# Patient Record
Sex: MALE | URBAN - METROPOLITAN AREA
[De-identification: names, ages, dates, MRNs, and addresses within clinical notes are randomized per-mention and may not be internally consistent; named-entity substitution may affect disease eponyms.]

---

## 2021-12-09 ENCOUNTER — EMERGENCY (EMERGENCY)
Facility: HOSPITAL | Age: 51
LOS: 1 days | End: 2021-12-09
Attending: EMERGENCY MEDICINE
Payer: COMMERCIAL

## 2021-12-09 VITALS
HEART RATE: 70 BPM | DIASTOLIC BLOOD PRESSURE: 105 MMHG | TEMPERATURE: 98 F | RESPIRATION RATE: 16 BRPM | OXYGEN SATURATION: 98 % | SYSTOLIC BLOOD PRESSURE: 174 MMHG

## 2021-12-09 LAB
ALBUMIN SERPL ELPH-MCNC: 3.5 G/DL — SIGNIFICANT CHANGE UP (ref 3.3–5)
ALP SERPL-CCNC: 90 U/L — SIGNIFICANT CHANGE UP (ref 40–120)
ALT FLD-CCNC: 27 U/L — SIGNIFICANT CHANGE UP (ref 12–78)
AMPHET UR-MCNC: NEGATIVE — SIGNIFICANT CHANGE UP
ANION GAP SERPL CALC-SCNC: 4 MMOL/L — LOW (ref 5–17)
APAP SERPL-MCNC: <2 UG/ML — SIGNIFICANT CHANGE UP (ref 10–30)
APPEARANCE UR: CLEAR — SIGNIFICANT CHANGE UP
APTT BLD: 36 SEC — HIGH (ref 27.5–35.5)
AST SERPL-CCNC: 14 U/L — LOW (ref 15–37)
BARBITURATES UR SCN-MCNC: NEGATIVE — SIGNIFICANT CHANGE UP
BASOPHILS # BLD AUTO: 0.03 K/UL — SIGNIFICANT CHANGE UP (ref 0–0.2)
BASOPHILS NFR BLD AUTO: 0.4 % — SIGNIFICANT CHANGE UP (ref 0–2)
BENZODIAZ UR-MCNC: NEGATIVE — SIGNIFICANT CHANGE UP
BILIRUB SERPL-MCNC: 0.4 MG/DL — SIGNIFICANT CHANGE UP (ref 0.2–1.2)
BILIRUB UR-MCNC: NEGATIVE — SIGNIFICANT CHANGE UP
BUN SERPL-MCNC: 28 MG/DL — HIGH (ref 7–23)
CALCIUM SERPL-MCNC: 9.2 MG/DL — SIGNIFICANT CHANGE UP (ref 8.5–10.1)
CHLORIDE SERPL-SCNC: 105 MMOL/L — SIGNIFICANT CHANGE UP (ref 96–108)
CK MB CFR SERPL CALC: 4.2 NG/ML — HIGH (ref 0–3.6)
CO2 SERPL-SCNC: 30 MMOL/L — SIGNIFICANT CHANGE UP (ref 22–31)
COCAINE METAB.OTHER UR-MCNC: NEGATIVE — SIGNIFICANT CHANGE UP
COLOR SPEC: YELLOW — SIGNIFICANT CHANGE UP
CREAT SERPL-MCNC: 1.2 MG/DL — SIGNIFICANT CHANGE UP (ref 0.5–1.3)
DIFF PNL FLD: NEGATIVE — SIGNIFICANT CHANGE UP
EOSINOPHIL # BLD AUTO: 0.1 K/UL — SIGNIFICANT CHANGE UP (ref 0–0.5)
EOSINOPHIL NFR BLD AUTO: 1.2 % — SIGNIFICANT CHANGE UP (ref 0–6)
GLUCOSE SERPL-MCNC: 116 MG/DL — HIGH (ref 70–99)
GLUCOSE UR QL: NEGATIVE — SIGNIFICANT CHANGE UP
HCT VFR BLD CALC: 38.7 % — LOW (ref 39–50)
HGB BLD-MCNC: 13.1 G/DL — SIGNIFICANT CHANGE UP (ref 13–17)
IMM GRANULOCYTES NFR BLD AUTO: 0.4 % — SIGNIFICANT CHANGE UP (ref 0–1.5)
INR BLD: 1.07 RATIO — SIGNIFICANT CHANGE UP (ref 0.88–1.16)
KETONES UR-MCNC: NEGATIVE — SIGNIFICANT CHANGE UP
LEUKOCYTE ESTERASE UR-ACNC: NEGATIVE — SIGNIFICANT CHANGE UP
LIDOCAIN IGE QN: 123 U/L — SIGNIFICANT CHANGE UP (ref 73–393)
LYMPHOCYTES # BLD AUTO: 2.67 K/UL — SIGNIFICANT CHANGE UP (ref 1–3.3)
LYMPHOCYTES # BLD AUTO: 32.8 % — SIGNIFICANT CHANGE UP (ref 13–44)
MAGNESIUM SERPL-MCNC: 2 MG/DL — SIGNIFICANT CHANGE UP (ref 1.6–2.6)
MCHC RBC-ENTMCNC: 29.3 PG — SIGNIFICANT CHANGE UP (ref 27–34)
MCHC RBC-ENTMCNC: 33.9 GM/DL — SIGNIFICANT CHANGE UP (ref 32–36)
MCV RBC AUTO: 86.6 FL — SIGNIFICANT CHANGE UP (ref 80–100)
METHADONE UR-MCNC: NEGATIVE — SIGNIFICANT CHANGE UP
MONOCYTES # BLD AUTO: 0.66 K/UL — SIGNIFICANT CHANGE UP (ref 0–0.9)
MONOCYTES NFR BLD AUTO: 8.1 % — SIGNIFICANT CHANGE UP (ref 2–14)
NEUTROPHILS # BLD AUTO: 4.64 K/UL — SIGNIFICANT CHANGE UP (ref 1.8–7.4)
NEUTROPHILS NFR BLD AUTO: 57.1 % — SIGNIFICANT CHANGE UP (ref 43–77)
NITRITE UR-MCNC: NEGATIVE — SIGNIFICANT CHANGE UP
NRBC # BLD: 0 /100 WBCS — SIGNIFICANT CHANGE UP (ref 0–0)
NT-PROBNP SERPL-SCNC: 225 PG/ML — HIGH (ref 0–125)
OPIATES UR-MCNC: NEGATIVE — SIGNIFICANT CHANGE UP
PCP SPEC-MCNC: SIGNIFICANT CHANGE UP
PCP UR-MCNC: NEGATIVE — SIGNIFICANT CHANGE UP
PH UR: 7 — SIGNIFICANT CHANGE UP (ref 5–8)
PLATELET # BLD AUTO: 228 K/UL — SIGNIFICANT CHANGE UP (ref 150–400)
POTASSIUM SERPL-MCNC: 4 MMOL/L — SIGNIFICANT CHANGE UP (ref 3.5–5.3)
POTASSIUM SERPL-SCNC: 4 MMOL/L — SIGNIFICANT CHANGE UP (ref 3.5–5.3)
PROT SERPL-MCNC: 7.8 G/DL — SIGNIFICANT CHANGE UP (ref 6–8.3)
PROT UR-MCNC: NEGATIVE — SIGNIFICANT CHANGE UP
PROTHROM AB SERPL-ACNC: 12.5 SEC — SIGNIFICANT CHANGE UP (ref 10.6–13.6)
RBC # BLD: 4.47 M/UL — SIGNIFICANT CHANGE UP (ref 4.2–5.8)
RBC # FLD: 14.6 % — HIGH (ref 10.3–14.5)
SALICYLATES SERPL-MCNC: <1.7 MG/DL — LOW (ref 2.8–20)
SODIUM SERPL-SCNC: 139 MMOL/L — SIGNIFICANT CHANGE UP (ref 135–145)
SP GR SPEC: 1 — LOW (ref 1.01–1.02)
THC UR QL: NEGATIVE — SIGNIFICANT CHANGE UP
TROPONIN I, HIGH SENSITIVITY RESULT: 9 NG/L — SIGNIFICANT CHANGE UP
TSH SERPL-MCNC: 1.7 UIU/ML — SIGNIFICANT CHANGE UP (ref 0.36–3.74)
UROBILINOGEN FLD QL: NEGATIVE — SIGNIFICANT CHANGE UP
WBC # BLD: 8.13 K/UL — SIGNIFICANT CHANGE UP (ref 3.8–10.5)
WBC # FLD AUTO: 8.13 K/UL — SIGNIFICANT CHANGE UP (ref 3.8–10.5)

## 2021-12-09 PROCEDURE — 93010 ELECTROCARDIOGRAM REPORT: CPT

## 2021-12-09 PROCEDURE — 99285 EMERGENCY DEPT VISIT HI MDM: CPT

## 2021-12-09 PROCEDURE — 90792 PSYCH DIAG EVAL W/MED SRVCS: CPT | Mod: 95

## 2021-12-09 NOTE — ED ADULT NURSE NOTE - HPI (INCLUDE ILLNESS QUALITY, SEVERITY, DURATION, TIMING, CONTEXT, MODIFYING FACTORS, ASSOCIATED SIGNS AND SYMPTOMS)
Pt states he is having auditory hallucinations, which the voices are telling him they will hurt him. Patient is unable to states the date as he states "I don't know", he is aware he is at [facility].   He states he has heard the voices for approx. 6 months.

## 2021-12-09 NOTE — ED PROVIDER NOTE - PROGRESS NOTE DETAILS
spoke with tele psych, requesting CT head, aware patient 1:1, labs, ekg discussed, will consult spoke with tele psych, requesting CT head, aware patient 1:1, labs, ekg discussed, will consult, patient aware of plan and agrees discussed case with Dr. Recio from telepsych, recommend neuro consult, will re evaluate patient after neuro eval dr powell to see pt  pt reevaluated, pt awake, appears to be catatonic now, not answering any questions, following selective commands, will try 1 ativan and reassess, pending psych and neuro reeval pt speaking with select tstaff members, then suddenly got out of bed sat down and started banging his head against the wall. code grey called but pt was placed back in bed prior to security arriving. 1 of ativan given. pt to go to [facility] and then neuro and psych eval seen by neuro, no neurologic concerns per dr juarze can [facility] imaging, pt endorses AH and SI   telepsych updated and will reeval pt accepted to psych, [facility]  pt continues to display self harm behaviors, head banging, attempted to tie the strings of his gown aroudn his neck. strings removed from gown, pt given haldol/ativan and soft wrist restraints

## 2021-12-09 NOTE — ED ADULT NURSE NOTE - OBJECTIVE STATEMENT
Pt states he is hearing voices that are saying "they will kill me" Pt states he is hearing voices that are saying "they will kill me"  Pt arrives from [employer], where he works. Pt states "I feel bad, I have been passing out". Pt states he has been hearing voices saying they are going to hurt him in his head for approx 6 months, he states he has seen a MD about this and has not been prescribed medications of any treatments. Pt Denies SI or HI at this time. He is calm, cooperative, follows commands.   Pt states he lives with his brothers at [address] [place] [address]

## 2021-12-09 NOTE — ED PROVIDER NOTE - OBJECTIVE STATEMENT
51 male presents to ER by ambulance with report of elevated blood pressure. 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 When patient in room in ER patient states he hears voices in his head and they are telling him to kill himself. Patient states he is from [facility], has been living in  for over 20 years, is not , has no children and lives with 4 roomates, patient currently does not have a PMD. Patient states he has been hearing voices for the past year and has never been hospitlized.

## 2021-12-10 ENCOUNTER — INPATIENT (INPATIENT)
Facility: HOSPITAL | Age: 51
LOS: 25 days | Discharge: HOME | End: 2022-01-05
Attending: PSYCHIATRY & NEUROLOGY | Admitting: PSYCHIATRY & NEUROLOGY
Payer: COMMERCIAL

## 2021-12-10 VITALS — TEMPERATURE: 98 F | RESPIRATION RATE: 18 BRPM | WEIGHT: 134.04 LBS

## 2021-12-10 VITALS
TEMPERATURE: 98 F | DIASTOLIC BLOOD PRESSURE: 67 MMHG | HEART RATE: 76 BPM | RESPIRATION RATE: 17 BRPM | OXYGEN SATURATION: 97 % | SYSTOLIC BLOOD PRESSURE: 132 MMHG

## 2021-12-10 DIAGNOSIS — F29 UNSPECIFIED PSYCHOSIS NOT DUE TO A SUBSTANCE OR KNOWN PHYSIOLOGICAL CONDITION: ICD-10-CM

## 2021-12-10 LAB — SARS-COV-2 RNA SPEC QL NAA+PROBE: SIGNIFICANT CHANGE UP

## 2021-12-10 PROCEDURE — 99285 EMERGENCY DEPT VISIT HI MDM: CPT | Mod: 25

## 2021-12-10 PROCEDURE — U0005: CPT

## 2021-12-10 PROCEDURE — 36415 COLL VENOUS BLD VENIPUNCTURE: CPT

## 2021-12-10 PROCEDURE — 71045 X-RAY EXAM CHEST 1 VIEW: CPT | Mod: 26

## 2021-12-10 PROCEDURE — 70450 CT HEAD/BRAIN W/O DYE: CPT | Mod: MA

## 2021-12-10 PROCEDURE — 82553 CREATINE MB FRACTION: CPT

## 2021-12-10 PROCEDURE — 80307 DRUG TEST PRSMV CHEM ANLYZR: CPT

## 2021-12-10 PROCEDURE — 84443 ASSAY THYROID STIM HORMONE: CPT

## 2021-12-10 PROCEDURE — 70450 CT HEAD/BRAIN W/O DYE: CPT | Mod: 26,MA

## 2021-12-10 PROCEDURE — 93005 ELECTROCARDIOGRAM TRACING: CPT

## 2021-12-10 PROCEDURE — 85730 THROMBOPLASTIN TIME PARTIAL: CPT

## 2021-12-10 PROCEDURE — 96374 THER/PROPH/DIAG INJ IV PUSH: CPT

## 2021-12-10 PROCEDURE — 85025 COMPLETE CBC W/AUTO DIFF WBC: CPT

## 2021-12-10 PROCEDURE — 99222 1ST HOSP IP/OBS MODERATE 55: CPT

## 2021-12-10 PROCEDURE — 83690 ASSAY OF LIPASE: CPT

## 2021-12-10 PROCEDURE — U0003: CPT

## 2021-12-10 PROCEDURE — 96375 TX/PRO/DX INJ NEW DRUG ADDON: CPT

## 2021-12-10 PROCEDURE — 81003 URINALYSIS AUTO W/O SCOPE: CPT

## 2021-12-10 PROCEDURE — 80053 COMPREHEN METABOLIC PANEL: CPT

## 2021-12-10 PROCEDURE — 84484 ASSAY OF TROPONIN QUANT: CPT

## 2021-12-10 PROCEDURE — 85610 PROTHROMBIN TIME: CPT

## 2021-12-10 PROCEDURE — 83880 ASSAY OF NATRIURETIC PEPTIDE: CPT

## 2021-12-10 PROCEDURE — 83735 ASSAY OF MAGNESIUM: CPT

## 2021-12-10 PROCEDURE — 71045 X-RAY EXAM CHEST 1 VIEW: CPT

## 2021-12-10 PROCEDURE — 96372 THER/PROPH/DIAG INJ SC/IM: CPT | Mod: XU

## 2021-12-10 RX ORDER — ACETAMINOPHEN 500 MG
650 TABLET ORAL EVERY 6 HOURS
Refills: 0 | Status: DISCONTINUED | OUTPATIENT
Start: 2021-12-10 | End: 2022-01-05

## 2021-12-10 RX ORDER — AMLODIPINE BESYLATE 2.5 MG/1
5 TABLET ORAL ONCE
Refills: 0 | Status: COMPLETED | OUTPATIENT
Start: 2021-12-10 | End: 2021-12-10

## 2021-12-10 RX ORDER — METOPROLOL TARTRATE 50 MG
25 TABLET ORAL ONCE
Refills: 0 | Status: COMPLETED | OUTPATIENT
Start: 2021-12-10 | End: 2021-12-10

## 2021-12-10 RX ORDER — HALOPERIDOL DECANOATE 100 MG/ML
2 INJECTION INTRAMUSCULAR ONCE
Refills: 0 | Status: DISCONTINUED | OUTPATIENT
Start: 2021-12-10 | End: 2021-12-17

## 2021-12-10 RX ORDER — ACETAMINOPHEN 500 MG
650 TABLET ORAL ONCE
Refills: 0 | Status: COMPLETED | OUTPATIENT
Start: 2021-12-10 | End: 2021-12-10

## 2021-12-10 RX ORDER — AMLODIPINE BESYLATE 2.5 MG/1
5 TABLET ORAL DAILY
Refills: 0 | Status: DISCONTINUED | OUTPATIENT
Start: 2021-12-10 | End: 2022-01-05

## 2021-12-10 RX ORDER — HALOPERIDOL DECANOATE 100 MG/ML
5 INJECTION INTRAMUSCULAR ONCE
Refills: 0 | Status: DISCONTINUED | OUTPATIENT
Start: 2021-12-10 | End: 2021-12-10

## 2021-12-10 RX ORDER — ARIPIPRAZOLE 15 MG/1
2 TABLET ORAL DAILY
Refills: 0 | Status: DISCONTINUED | OUTPATIENT
Start: 2021-12-10 | End: 2021-12-14

## 2021-12-10 RX ORDER — LOSARTAN POTASSIUM 100 MG/1
50 TABLET, FILM COATED ORAL ONCE
Refills: 0 | Status: COMPLETED | OUTPATIENT
Start: 2021-12-10 | End: 2021-12-10

## 2021-12-10 RX ORDER — METOPROLOL TARTRATE 50 MG
25 TABLET ORAL
Refills: 0 | Status: DISCONTINUED | OUTPATIENT
Start: 2021-12-10 | End: 2022-01-05

## 2021-12-10 RX ORDER — HALOPERIDOL DECANOATE 100 MG/ML
5 INJECTION INTRAMUSCULAR ONCE
Refills: 0 | Status: COMPLETED | OUTPATIENT
Start: 2021-12-10 | End: 2021-12-10

## 2021-12-10 RX ORDER — AMLODIPINE BESYLATE 2.5 MG/1
5 TABLET ORAL DAILY
Refills: 0 | Status: DISCONTINUED | OUTPATIENT
Start: 2021-12-10 | End: 2021-12-10

## 2021-12-10 RX ORDER — TRAZODONE HCL 50 MG
50 TABLET ORAL AT BEDTIME
Refills: 0 | Status: DISCONTINUED | OUTPATIENT
Start: 2021-12-10 | End: 2022-01-05

## 2021-12-10 RX ORDER — HYDRALAZINE HCL 50 MG
10 TABLET ORAL ONCE
Refills: 0 | Status: COMPLETED | OUTPATIENT
Start: 2021-12-10 | End: 2021-12-10

## 2021-12-10 RX ORDER — METOPROLOL TARTRATE 50 MG
25 TABLET ORAL
Refills: 0 | Status: DISCONTINUED | OUTPATIENT
Start: 2021-12-10 | End: 2021-12-10

## 2021-12-10 RX ORDER — HALOPERIDOL DECANOATE 100 MG/ML
2 INJECTION INTRAMUSCULAR EVERY 6 HOURS
Refills: 0 | Status: DISCONTINUED | OUTPATIENT
Start: 2021-12-10 | End: 2021-12-17

## 2021-12-10 RX ADMIN — LOSARTAN POTASSIUM 50 MILLIGRAM(S): 100 TABLET, FILM COATED ORAL at 11:29

## 2021-12-10 RX ADMIN — Medication 25 MILLIGRAM(S): at 20:41

## 2021-12-10 RX ADMIN — HALOPERIDOL DECANOATE 5 MILLIGRAM(S): 100 INJECTION INTRAMUSCULAR at 12:00

## 2021-12-10 RX ADMIN — Medication 2 MILLIGRAM(S): at 12:52

## 2021-12-10 RX ADMIN — AMLODIPINE BESYLATE 5 MILLIGRAM(S): 2.5 TABLET ORAL at 03:45

## 2021-12-10 RX ADMIN — Medication 10 MILLIGRAM(S): at 05:29

## 2021-12-10 RX ADMIN — Medication 25 MILLIGRAM(S): at 04:59

## 2021-12-10 RX ADMIN — Medication 1 MILLIGRAM(S): at 09:00

## 2021-12-10 RX ADMIN — AMLODIPINE BESYLATE 5 MILLIGRAM(S): 2.5 TABLET ORAL at 11:29

## 2021-12-10 RX ADMIN — Medication 650 MILLIGRAM(S): at 11:29

## 2021-12-10 NOTE — BH INPATIENT PSYCHIATRY ASSESSMENT NOTE - NSBHCHARTREVIEWVS_PSY_A_CORE FT
Vital Signs Last 24 Hrs  T(C): 36.6 (12-10-21 @ 15:16), Max: 36.9 (12-10-21 @ 01:30)  T(F): 97.8 (12-10-21 @ 15:16), Max: 98.5 (12-10-21 @ 01:30)  HR: 76 (12-10-21 @ 15:16) (68 - 84)  BP: 132/67 (12-10-21 @ 15:16) (132/67 - 204/107)  BP(mean): --  RR: 17 (12-10-21 @ 15:16) (16 - 17)  SpO2: 97% (12-10-21 @ 15:16) (97% - 100%)     Vital Signs Last 24 Hrs  T(C): 36.8 (12-10-21 @ 18:07), Max: 36.9 (12-10-21 @ 01:30)  T(F): 98.3 (12-10-21 @ 18:07), Max: 98.5 (12-10-21 @ 01:30)  HR: 76 (12-10-21 @ 15:16) (68 - 84)  BP: 132/67 (12-10-21 @ 15:16) (132/67 - 204/107)  BP(mean): --  RR: 18 (12-10-21 @ 18:07) (16 - 18)  SpO2: 97% (12-10-21 @ 15:16) (97% - 100%)    Orthostatic VS  12-10-21 @ 18:07  Lying BP: --/-- HR: --  Sitting BP: 131/85 HR: 80  Standing BP: 118/88 HR: 93  Site: --  Mode: --

## 2021-12-10 NOTE — ED BEHAVIORAL HEALTH ASSESSMENT NOTE - HPI (INCLUDE ILLNESS QUALITY, SEVERITY, DURATION, TIMING, CONTEXT, MODIFYING FACTORS, ASSOCIATED SIGNS AND SYMPTOMS)
51y o  male, Serbian speaking, originally from [facility], single, employed washing dishes at a restaurant, domiciled with four roommates including his brother, per psyckes a hx of etoh use disorders, cannabis use disorder, substance induced psychotic d/o, anxiety and major depressive disorder; one prior psychiatric hospitalization at [facility] in 2020 with diagnoses of Alcohol Induced Psychotic d/o; no known prior suicidality/aggression/legal/medical hx; bib ems, activated by pt's boss when he reported he felt lightheaded and fainted at work. Psych consult called by intial ed provider who stated pt reported CAH to kill himself.   Patient seen with . even with intepreter, he is a poor historian, with increased speech latency, seemingly decreased attention at times, not answering certain questions, staring straight ahead, with an odd smile on his face at times.  His main complaint is that he doesn't feel well physically, specifically that he feels lightheaded and dizzy. He does not have psychiatric complaints initially. On repeated questioning, he admits to hearing voices chronically. He denies they are command, or that they are to harm himself. he denies si.   he cannot describe the voices, saying several times "I just hear voices."   he initially denies seeing a psychiatrist then says he does so, and that he takes medications but cannot name what they are.   he denies recent substance use of any sort.  utox and bal were negative.  head ct neg   see  note for collateral from brother. his reliability was questionable and he seemed not to have details of pt's history. he said pt took medications and hear voices that he was in danger but could not provide specifics.

## 2021-12-10 NOTE — BH INPATIENT PSYCHIATRY ASSESSMENT NOTE - NSBHCHARTREVIEWLAB_PSY_A_CORE FT
Comprehensive Metabolic Panel (12.09.21 @ 22:00)   Sodium, Serum: 139 mmol/L   Potassium, Serum: 4.0 mmol/L   Chloride, Serum: 105 mmol/L   Carbon Dioxide, Serum: 30 mmol/L   Anion Gap, Serum: 4 mmol/L   Blood Urea Nitrogen, Serum: 28 mg/dL   Creatinine, Serum: 1.20 mg/dL   Glucose, Serum: 116 mg/dL   Calcium, Total Serum: 9.2 mg/dL   Protein Total, Serum: 7.8 g/dL   Albumin, Serum: 3.5 g/dL   Bilirubin Total, Serum: 0.4 mg/dL   Alkaline Phosphatase, Serum: 90 U/L   Aspartate Aminotransferase (AST/SGOT): 14 U/L   Alanine Aminotransferase (ALT/SGPT): 27 U/L Complete Blood Count + Automated Diff (12.09.21 @ 22:00)   WBC Count: 8.13 K/uL   RBC Count: 4.47 M/uL   Hemoglobin: 13.1 g/dL   Hematocrit: 38.7 %   Mean Cell Volume: 86.6 fl   Mean Cell Hemoglobin: 29.3 pg   Mean Cell Hemoglobin Conc: 33.9 gm/dL   Red Cell Distrib Width: 14.6 %   Platelet Count - Automated: 228 K/uL   Auto Neutrophil #: 4.64 K/uL   Auto Lymphocyte #: 2.67 K/uL   Auto Monocyte #: 0.66 K/uL   Auto Eosinophil #: 0.10 K/uL   Auto Basophil #: 0.03 K/uL   Auto Neutrophil %: 57.1: Differential percentages must be correlated with absolute numbers for   clinical significance. %   Auto Lymphocyte %: 32.8 %   Auto Monocyte %: 8.1 %   Auto Eosinophil %: 1.2 %   Auto Basophil %: 0.4 %   Auto Immature Granulocyte %: 0.4: (Includes meta, myelo and promyelocytes) %   Nucleated RBC: 0 /100 WBCs

## 2021-12-10 NOTE — BH PATIENT PROFILE - FALL HARM RISK - UNIVERSAL INTERVENTIONS
Bed in lowest position, wheels locked, appropriate side rails in place/Call bell, personal items and telephone in reach/Instruct patient to call for assistance before getting out of bed or chair/Non-slip footwear when patient is out of bed/[place] to call system/Physically safe environment - no spills, clutter or unnecessary equipment/Purposeful Proactive Rounding/Room/bathroom lighting operational, light cord in reach

## 2021-12-10 NOTE — BH INPATIENT PSYCHIATRY ASSESSMENT NOTE - CURRENT MEDICATION
MEDICATIONS  (STANDING):    MEDICATIONS  (PRN):   MEDICATIONS  (STANDING):  amLODIPine   Tablet 5 milliGRAM(s) Oral daily  ARIPiprazole 2 milliGRAM(s) Oral daily  metoprolol tartrate 25 milliGRAM(s) Oral two times a day    MEDICATIONS  (PRN):  acetaminophen     Tablet .. 650 milliGRAM(s) Oral every 6 hours PRN Moderate Pain (4 - 6), Severe Pain (7 - 10)  haloperidol     Tablet 2 milliGRAM(s) Oral every 6 hours PRN Agitation  haloperidol    Injectable 2 milliGRAM(s) IntraMuscular once PRN Agitation  LORazepam     Tablet 1 milliGRAM(s) Oral every 6 hours PRN Anxiety/ agitation  LORazepam   Injectable 1 milliGRAM(s) IntraMuscular once PRN Agitation  traZODone 50 milliGRAM(s) Oral at bedtime PRN Insomnia

## 2021-12-10 NOTE — ED BEHAVIORAL HEALTH ASSESSMENT NOTE - SUMMARY
51y o  male, Turkish speaking, originally from [facility], single, employed washing dishes at a restaurant, domiciled with four roommates including his brother, per psyckes a hx of etoh use disorders, cannabis use disorder, substance induced psychotic d/o, anxiety and major depressive disorder; one prior psychiatric hospitalization at [facility] in 2020 with diagnoses of Alcohol Induced Psychotic d/o; no known prior suicidality/aggression/legal/medical hx; bib ems, activated by pt's boss when he reported he felt lightheaded and fainted at work. Psych consult called by intial ed provider who stated pt reported CAH to kill himself.  on evaluation pt reports dizziness. he fainted earlier tonight. he is a poor historian and shows some signs of psychosis; psyckes review reveals hx of substance abuse and substance induced psychotic d/o, though pt's utox and BAL are negative. most likely pt's presentation is influenced by substance use of some kind, given his hx. it's possible pt used occult substance or is withdrawing from etoh or other substance. vitamin deficiency, metabolic abnormality and neurological illnesses are also possible. would replete with thiamine, psych will reassess, and neuro consult would also be useful.

## 2021-12-10 NOTE — BH INPATIENT PSYCHIATRY ASSESSMENT NOTE - NSBHASSESSSUMMFT_PSY_ALL_CORE
51M, Tamazight-speaking, originally from [facility], single, employed washing dishes at a restaurant, domiciled with four roommates including his brother, per psyckes a hx of etoh use disorders, cannabis use disorder, substance induced psychotic d/o, anxiety and major depressive disorder; one prior psychiatric hospitalization at [facility] in 2020 with diagnoses of Alcohol Induced Psychotic d/o; no known prior suicidality/aggression/legal/medical hx; bib ems, activated by pt's boss when he reported he felt lightheaded and fainted at work. Psych consult called by intial ed provider who stated pt reported CAH to kill himself. Transferred to [facility] for CAH/psychosis.     On assessment, patient is sleepy, with difficulty staying awake, uncooperative, and minimally responsive to questioning. Patient says that he does not feel well. States he feels dizzy but does not elaborate. Does not respond to questions w/r/t/ psychiatric symptoms. Overall impression is substance-induced psychotic disorder vs. substance-induced mood disorder vs. primary psychotic disorder.     Requires inpatient admission for safety, stabilization, and safe discharge planning.     PLAN:    1. Legal: Admitted on 9.27 status  2. Safety: No reported SI/SIB/HI/VI currently on unit; continue routine observation.     3. Psychiatric:     4. Therapy: individual therapy, group & milieu therapy  5. Medical: No acute medical needs identified  6. Obtain collateral  7. Disposition: When stable    51M, Malay-speaking, originally from [facility], single, employed washing dishes at a restaurant, domiciled with four roommates including his brother, per psyckes a hx of etoh use disorders, cannabis use disorder, substance induced psychotic d/o, anxiety and major depressive disorder; one prior psychiatric hospitalization at [facility] in 2020 with diagnoses of Alcohol Induced Psychotic d/o; no known prior suicidality/aggression/legal/medical hx; bib ems, activated by pt's boss when he reported he felt lightheaded and fainted at work. Psych consult called by intial ed provider who stated pt reported CAH to kill himself. Transferred to [facility] for CAH/psychosis.     On assessment, patient is sleepy, with difficulty staying awake, uncooperative, and minimally responsive to questioning. Patient says that he does not feel well. States he feels dizzy but does not elaborate. Does not respond to questions w/r/t/ psychiatric symptoms. Overall impression is substance-induced psychotic disorder vs. substance-induced mood disorder vs. primary psychotic disorder.     Requires inpatient admission for safety, stabilization, and safe discharge planning.     PLAN:   1. Legal: Admitted on 9.27 status  2. Safety: No reported SI/SIB/HI/VI currently on unit; continue routine observation.   3. Psychiatric: Abilify 2mg PO daily for psychosis. PRN Haldol 2mg PO/IM Q6H and Ativan 1mg PO/IM for agitation, PRN Trazodone 50mg PO QHS for insomnia  4. Therapy: individual therapy, group & milieu therapy  5. Medical: Amlodipine 5mg daily and Metoprolol 25mg BID continued with hold parameters.    6. Obtain collateral  7. Disposition: When stable

## 2021-12-10 NOTE — ED ADULT NURSE REASSESSMENT NOTE - NSIMPLEMENTINTERV_GEN_ALL_ED
Implemented All Fall with Harm Risk Interventions:  [place] to call system. Call bell, personal items and telephone within reach. Instruct patient to call for assistance. Room bathroom lighting operational. Non-slip footwear when patient is off stretcher. Physically safe environment: no spills, clutter or unnecessary equipment. Stretcher in lowest position, wheels locked, appropriate side rails in place. Provide visual cue, wrist band, yellow gown, etc. Monitor gait and stability. Monitor for mental status changes and reorient to person, place, and time. Review medications for side effects contributing to fall risk. Reinforce activity limits and safety measures with patient and family. Provide visual clues: red socks.

## 2021-12-10 NOTE — BH INPATIENT PSYCHIATRY ASSESSMENT NOTE - CASE SUMMARY
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 As [per ED documentation, patient known to have a PPH of alcohol use disorder, cannabis use disorder, substance induced psychotic d/o, and anxiety and major depressive disorder. Patient with one known prior psychiatric hospitalization at [facility] in 2020 with diagnoses of alcohol induced psychotic disorder, no known prior suicidality or aggression. Patient BIBEMS to ED, activated by patient’s boss when he reported he felt lightheaded and fainted at work. While at ED, psych consult called by initial ED provider after patient reported CAH to kill himself. Patient now transferred to [facility] for symptom stabilization. Of note, patient’s UTox and BAL were negative.   Patient interviewed with the aid of [place]  services. During interview, patient is somnolent, is minimally engaging with poor eye contact. He reports that he felt dizzy and does not answer any further questions directed at him. Repeated attempts to engage him are unsuccessful and interview terminated. Plan is to initiate Abilify 2mg PO daily for psychosis. PRN Haldol and Ativan also added for agitation. Rest as documented in Dr. Smith’s note.  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 As per ED documentation, patient known to have a PPH of alcohol use disorder, cannabis use disorder, substance induced psychotic d/o, and anxiety and major depressive disorder. Patient with one known prior psychiatric hospitalization at [facility] in 2020 with diagnoses of alcohol induced psychotic disorder, no known prior suicidality or aggression. Patient BIBEMS to ED, activated by patient’s boss when he reported he felt lightheaded and fainted at work. While at ED, psych consult called by initial ED provider after patient reported CAH to kill himself. Patient now transferred to [facility] for symptom stabilization. Of note, patient’s UTox and BAL were negative.   Patient interviewed with the aid of [place]  services. During interview, patient is somnolent, is minimally engaging with poor eye contact. He reports that he felt dizzy and does not answer any further questions directed at him. Repeated attempts to engage him are unsuccessful and interview terminated. Plan is to initiate Abilify 2mg PO daily for psychosis. PRN Haldol and Ativan also added for agitation. Rest as documented in Dr. Smith’s note.

## 2021-12-10 NOTE — BH PATIENT PROFILE - HOME MEDICATIONS
losartan 50 mg oral tablet , 1 tab(s) orally  metoprolol tartrate 25 mg oral tablet  amLODIPine 5 mg oral tablet

## 2021-12-10 NOTE — BH INPATIENT PSYCHIATRY ASSESSMENT NOTE - HPI (INCLUDE ILLNESS QUALITY, SEVERITY, DURATION, TIMING, CONTEXT, MODIFYING FACTORS, ASSOCIATED SIGNS AND SYMPTOMS)
51M, Amharic-speaking, originally from [facility], single, employed washing dishes at a restaurant, domiciled with four roommates including his brother, per psyckes a hx of etoh use disorders, cannabis use disorder, substance induced psychotic d/o, anxiety and major depressive disorder; one prior psychiatric hospitalization at [facility] in 2020 with diagnoses of Alcohol Induced Psychotic d/o; no known prior suicidality/aggression/legal/medical hx; bib ems, activated by pt's boss when he reported he felt lightheaded and fainted at work. Psych consult called by intial ed provider who stated pt reported CAH to kill himself. Transferred to [facility] for CAH/psychosis.     On assessment, patient is sleepy, with difficulty staying awake, uncooperative, and minimally responsive to questioning. Patient says that he does not feel well. States he feels dizzy but does not elaborate. Does not respond to questions w/r/t/ psychiatric symptoms. Patient unaware that he is in a psychiatric hospital. Does answer questions about whether he has been in a psychiatric hospital or received psychotropic medication in the past. Patient nods off throughout interview, ultimately leading to termination of the interview.  51M, Ukrainian-speaking, originally from [facility], single, employed washing dishes at a restaurant, domiciled with four roommates including his brother, per benji a hx of etoh use disorders, cannabis use disorder, substance induced psychotic d/o, anxiety and major depressive disorder; one prior psychiatric hospitalization at [facility] in 2020 with diagnoses of Alcohol Induced Psychotic d/o; no known prior suicidality/aggression/legal/medical hx; bib ems, activated by pt's boss when he reported he felt lightheaded and fainted at work. Psych consult called by intial ed provider who stated pt reported CAH to kill himself. Transferred to [facility] for CAH/psychosis.     On assessment, patient is sleepy, with difficulty staying awake, uncooperative, and minimally responsive to questioning. Patient says that he does not feel well. States he feels dizzy but does not elaborate. Does not respond to questions w/r/t/ psychiatric symptoms. Patient unaware that he is in a psychiatric hospital. Does answer questions about whether he has been in a psychiatric hospital or received psychotropic medication in the past. Patient nods off throughout interview, ultimately leading to termination of the interview.     From ED  Asssessment Note 12/10/2021:   "51y o  male, Albanian speaking, originally from [facility], single, employed washing dishes at a restaurant, domiciled with four roommates including his brother, per benji a hx of etoh use disorders, cannabis use disorder, substance induced psychotic d/o, anxiety and major depressive disorder; one prior psychiatric hospitalization at [facility] in 2020 with diagnoses of Alcohol Induced Psychotic d/o; no known prior suicidality/aggression/legal/medical hx; bib ems, activated by pt's boss when he reported he felt lightheaded and fainted at work. Psych consult called by intial ed provider who stated pt reported CAH to kill himself.   Patient seen with . even with intepreter, he is a poor historian, with increased speech latency, seemingly decreased attention at times, not answering certain questions, staring straight ahead, with an odd smile on his face at times.  His main complaint is that he doesn't feel well physically, specifically that he feels lightheaded and dizzy. He does not have psychiatric complaints initially. On repeated questioning, he admits to hearing voices chronically. He denies they are command, or that they are to harm himself. he denies si.   he cannot describe the voices, saying several times "I just hear voices."   he initially denies seeing a psychiatrist then says he does so, and that he takes medications but cannot name what they are.   he denies recent substance use of any sort.  utox and bal were negative.  head ct neg   see  note for collateral from brother. his reliability was questionable and he seemed not to have details of pt's history. he said pt took medications and hear voices that he was in danger but could not provide specifics."   51M, Icelandic-speaking, originally from [facility], single, employed washing dishes at a restaurant, domiciled with four roommates including his brother, per benji a hx of etoh use disorders, cannabis use disorder, substance induced psychotic d/o, anxiety and major depressive disorder; one prior psychiatric hospitalization at [facility] in 2020 with diagnoses of Alcohol Induced Psychotic d/o; no known prior suicidality/aggression/legal/medical hx; bib ems to [facility], activated by pt's boss when he reported he felt lightheaded and fainted at work. Psych consult called by intial ed provider who stated pt reported CAH to kill himself. Transferred to [facility] for CAH/psychosis.     On assessment, patient is sleepy, with difficulty staying awake, uncooperative, and minimally responsive to questioning. Patient says that he does not feel well. States he feels dizzy but does not elaborate. Does not respond to questions w/r/t/ psychiatric symptoms. Patient unaware that he is in a psychiatric hospital. Does answer questions about whether he has been in a psychiatric hospital or received psychotropic medication in the past. Patient nods off throughout interview, ultimately leading to termination of the interview.     From ED  Asssessment Note 12/10/2021:   "51y o  male, Azeri speaking, originally from [facility], single, employed washing dishes at a restaurant, domiciled with four roommates including his brother, per brookejennie a hx of etoh use disorders, cannabis use disorder, substance induced psychotic d/o, anxiety and major depressive disorder; one prior psychiatric hospitalization at [facility] in 2020 with diagnoses of Alcohol Induced Psychotic d/o; no known prior suicidality/aggression/legal/medical hx; bib ems, activated by pt's boss when he reported he felt lightheaded and fainted at work. Psych consult called by intial ed provider who stated pt reported CAH to kill himself.   Patient seen with . even with intepreter, he is a poor historian, with increased speech latency, seemingly decreased attention at times, not answering certain questions, staring straight ahead, with an odd smile on his face at times.  His main complaint is that he doesn't feel well physically, specifically that he feels lightheaded and dizzy. He does not have psychiatric complaints initially. On repeated questioning, he admits to hearing voices chronically. He denies they are command, or that they are to harm himself. he denies si.   he cannot describe the voices, saying several times "I just hear voices."   he initially denies seeing a psychiatrist then says he does so, and that he takes medications but cannot name what they are.   he denies recent substance use of any sort.  utox and bal were negative.  head ct neg   see  note for collateral from brother. his reliability was questionable and he seemed not to have details of pt's history. he said pt took medications and hear voices that he was in danger but could not provide specifics."

## 2021-12-10 NOTE — ED BEHAVIORAL HEALTH ASSESSMENT NOTE - NS ED BHA DEMOGRAPHICS MEDICAL RECORD REVIEWED CONSENT OBTAINED YN
[No falls in past year] : Patient reported no falls in the past year [0] : 1) Little interest or pleasure doing things: Not at all (0) [[id number]] : 0 Yes

## 2021-12-10 NOTE — BH INPATIENT PSYCHIATRY ASSESSMENT NOTE - MSE UNSTRUCTURED FT
MSE:  MAR/BEH: Adult male in no acute distress; dressed in hospital gown; sleepy yet somewhat arousable, uncooperative; poor eye contact.   SPEECH: normal rate, tone, volume; diminished production   MOTOR: No PMR/PMA; no other abnormal movements.   MOOD: “don't feel well"  AFFECT: depressed; blunted, constricted, limited range.   THOUGHT PROCESS: impovershed.   THOUGHT CONTENT: Denies SI/HI; no evidence of delusions.   PERCEPTIONS: Unable to determine AVH. Does not appear internally preoccupied.   COGNITION: Grossly intact.   INSIGHT: Poor.   JUDGMENT: Poor.  IMPULSE CONTROL: Intact.   MAR/BEH: Adult male in no acute distress; dressed in hospital gown; sleepy yet somewhat arousable, uncooperative; poor eye contact.   SPEECH: normal rate, tone, volume; diminished production   MOTOR: No PMR/PMA; no other abnormal movements.   MOOD: “don't feel well"  AFFECT: depressed; blunted, constricted, limited range.   THOUGHT PROCESS: impoverished.   THOUGHT CONTENT: Denies SI/HI; no evidence of delusions.   PERCEPTIONS: Unable to determine AVH. Does not appear internally preoccupied.   COGNITION: Grossly intact.   INSIGHT: Poor.   JUDGMENT: Poor.  IMPULSE CONTROL: Intact.

## 2021-12-10 NOTE — BH INPATIENT PSYCHIATRY ASSESSMENT NOTE - DESCRIPTION
From [facility]. Works as . Lives with roommates. History of substance misuse.

## 2021-12-10 NOTE — ED BEHAVIORAL HEALTH ASSESSMENT NOTE - ESTIMATED INTELLIGENCE
Subjective:      Concetta Hargrove is a 29 y.o. female who presents with Fever (fevers, breast pain on friday night)            This is a new problem. Pt reports she developed high fevers, up 105F over the last 2 days. She admits she did have some urinary frequency last week but she denies any other symptoms. Denies dysuria, urgency, hematuria, nausea or vomiting. She is one month post partum, she is breastfeeding. Denies any pain or warmth to her breasts. She admits something similar happened 3 years ago after her first baby and she was dx in the ER with a UTI. She has been in recent contact with her OBGYN but advised to come to the  for her high fevers. She has been taking acetaminophen and ibuprofen to help reduce her fever.        Review of Systems   Constitutional: Positive for fever.   Gastrointestinal: Negative for abdominal pain, nausea and vomiting.   Genitourinary: Positive for frequency. Negative for dysuria, flank pain and urgency.   All other systems reviewed and are negative.    No past medical history on file.   Past Surgical History:   Procedure Laterality Date   • REPEAT C SECTION N/A 3/28/2018    Procedure: REPEAT C SECTION;  Surgeon: Bernie Barriga M.D.;  Location: LABOR AND DELIVERY;  Service: Labor and Delivery   • PRIMARY C SECTION  7/6/2015    Procedure: PRIMARY C SECTION;  Surgeon: Bernie Barriga M.D.;  Location: LABOR AND DELIVERY;  Service:    • ABDOMINAL EXPLORATION     • APPENDECTOMY     • TONSILLECTOMY        Social History     Social History   • Marital status:      Spouse name: N/A   • Number of children: N/A   • Years of education: N/A     Occupational History   • Not on file.     Social History Main Topics   • Smoking status: Never Smoker   • Smokeless tobacco: Never Used   • Alcohol use No      Comment: occasional   • Drug use: No   • Sexual activity: Not on file     Other Topics Concern   • Not on file     Social History Narrative   • No narrative on file           "Objective:     /86   Pulse (!) 122   Temp 37.4 °C (99.3 °F)   Resp 20   Ht 1.575 m (5' 2\")   Wt 67.1 kg (148 lb)   SpO2 94%   BMI 27.07 kg/m²      Physical Exam   Constitutional: She is oriented to person, place, and time. Vital signs are normal. She appears well-developed and well-nourished.   HENT:   Head: Normocephalic and atraumatic.   Right Ear: Tympanic membrane and external ear normal.   Left Ear: Tympanic membrane and external ear normal.   Nose: Nose normal.   Eyes: EOM are normal. Pupils are equal, round, and reactive to light.   Neck: Normal range of motion.   Cardiovascular: Normal rate and regular rhythm.    Pulmonary/Chest: Effort normal.   Abdominal: Soft. Normal appearance and bowel sounds are normal. There is no tenderness. There is no CVA tenderness.   Musculoskeletal: Normal range of motion.   Lymphadenopathy:        Head (right side): Submandibular adenopathy present.        Head (left side): Submandibular adenopathy present.   Neurological: She is alert and oriented to person, place, and time.   Skin: Skin is warm and dry. Capillary refill takes less than 2 seconds.   Psychiatric: She has a normal mood and affect. Her speech is normal and behavior is normal. Thought content normal.   Vitals reviewed.         Lab Results   Component Value Date/Time    POCCOLOR light yellow 04/22/2018 05:28 PM    POCAPPEAR slighty cloudy 04/22/2018 05:28 PM    POCLEUKEST moderate 04/22/2018 05:28 PM    POCNITRITE neg 04/22/2018 05:28 PM    POCUROBILIGE neg 04/22/2018 05:28 PM    POCPROTEIN neg 04/22/2018 05:28 PM    POCURPH 5.0 04/22/2018 05:28 PM    POCBLOOD trace 04/22/2018 05:28 PM    POCSPGRV 1.010 04/22/2018 05:28 PM    POCKETONES neg 04/22/2018 05:28 PM    POCBILIRUBIN neg 04/22/2018 05:28 PM    POCGLUCUA neg 04/22/2018 05:28 PM           Assessment/Plan:     1. Fever, unspecified fever cause  - POCT Urinalysis  - cefTRIAXone (ROCEPHIN) injection 1 g; 1,000 mg by Intramuscular route Once.    2. " Urinary tract infection with hematuria, site unspecified  - Urine Culture; Future  - ciprofloxacin (CIPRO) 500 MG Tab; Take 1 Tab by mouth every 12 hours for 7 days.  Dispense: 14 Tab; Refill: 0  - cefTRIAXone (ROCEPHIN) injection 1 g; 1,000 mg by Intramuscular route Once.    Discussed with patient her condition is guarded due to high fevers and potentially prolonged disease progression since she was virtually asymptomatic. At this time she does not appear septic, her vitals are stable.   She admits that her lochia is light and denies any heavy vaginal bleeding or passing of clots.  Discussed with patient the relative infant risk is low pertaining to how much antibiotic baby will receive through breastmilk. Mother advised she can pump and dump as well, she understands  Continue tylenol and ibuprofen as needed for fevers  Strict ER precautions for continued fevers, new vomiting or generalized worsening condition.  Increase water intake  Will call with urine cx results  Supportive care, differential diagnoses, and indications for immediate follow-up discussed with patient.    Pathogenesis of diagnosis discussed including typical length and natural progression.      Instructed to return to  or nearest emergency department if symptoms fail to improve, for any change in condition, further concerns, or new concerning symptoms.  Patient states understanding of the plan of care and discharge instructions.'     Average

## 2021-12-10 NOTE — BH INPATIENT PSYCHIATRY ASSESSMENT NOTE - RISK ASSESSMENT
Risk factors include hx substance abuse, hx of psychosis, not currently in treatment, unwillingness to engage in treatment planning, limited insight, immigration status.   Protective factors include stable housing and work.

## 2021-12-10 NOTE — ED BEHAVIORAL HEALTH NOTE - BEHAVIORAL HEALTH NOTE
Patient seen and reassessed this morning. Chart reviewed. Spoke at length with EM provider. Patient this morning has been selectively mute with certain staff. He was briefly thought to be catatonic and given lorazepam 1 mg at 8:10 AM with good effect. Pacific Language  #[id number] utilized.    FULL NOTE TO FOLLOW  PATIENT FOR PSYCH ADMISSION ONCE MEDICALLY CLEARED.     Impression/Plan:  Tripp is a 51 year-old  male, Cook Islander speaking, originally from [facility], single, employed washing dishes at a restaurant, domiciled with four roommates including his brother, per psyckes a hx of etoh use disorders, cannabis use disorder, substance induced psychotic d/o, anxiety and major depressive disorder; one prior psychiatric hospitalization at [facility] in 2020 with diagnoses of Alcohol Induced Psychotic d/o; no known prior suicidality/aggression/legal/medical hx; bib ems, activated by pt's boss when he reported he felt lightheaded and fainted at work. Psych consult called by intial ed provider who stated pt reported CAH to kill himself. On re-evaluation this morning patient is endorsing thought reports dizziness. he fainted earlier tonight. he is a poor historian and shows some signs of psychosis; psyckes review reveals hx of substance abuse and substance induced psychotic d/o, though pt's utox and BAL are negative. most likely pt's presentation is influenced by substance use of some kind, given his hx. it's possible pt used occult substance or is withdrawing from etoh or other substance. vitamin deficiency, metabolic abnormality and neurological illnesses are also possible. would replete with thiamine, psych will reassess, and neuro consult would also be useful.    Differential remains broad at this time and includes: unspecified psychotic disorder versus substance induced psychotic d/o versus unspecified mood disorder     Plan:   -Per EM provider, patient is medically cleared at this time  -Admit to inpatient psychiatry on 9.27 involuntary status  -Continue patient outpatient HTN meds, as documented per EM  -Recommending Haldol 5 mg q6 PO/IM PRN agitation, Ativan q6 PO/IM PRN agitation, Benadryl 50 mg PO/IM q6 PRN agitation Patient seen and reassessed this morning. Chart reviewed. Spoke at length with EM provider. Patient this morning has been selectively mute with certain staff. He was briefly thought to be catatonic and given lorazepam 1 mg at 8:10 AM with good effect. Pacific Language  #[id number] utilized. Patient endorses AH of "voices" as well as low mood. He also endorses having "thoughts to hurt himself". Earlier this morning patient got up from stretcher and started banging his head against the wall, Chuck Potts was called. Patient was seen by neurology and "cleared". Exam is limited as he answers in short phrases and mumbles. Endorses SI but denies intent or specific plan. Cannot elaborate or engage in safety planning at this time. Denies any HI/violent/aggressive ideation.    On mental status exam, patient appears dishevelled. He intermittently closes his eyes at times during interview and at other times is staring intensely. He appears to be responding to internal stimuli at times. At this moment he is calm and mostly cooperative. Assessment of gait was deferred. No abnormal movements noted. Speech is soft, mumbled and not very productive. There is increased response latency. Mood reported as "bad"; affect appears dysphoric, blunted in range, affect is appropriate to content discussed. TP is non-linear, illogical, TC is remarkable for suicidal ideation without intent or plan and thoughts of self harm without suicidal intent. Denies any homicidal, violent, or aggressive ideation, intent or plan. Endorses AH of "voices". Denies VH or any other perceptual disturbances. Denies any paranoid ideation. No overt delusions elicited. Alert and oriented x4. Cognition is grossly intact. Insight and judgment are poor.    Impression/Plan:  Tripp is a 51 year-old  male, Angolan speaking, originally from [place], single, employed washing dishes at a restaurant, domiciled with four roommates including his brother, per psyckes a hx of etoh use disorders, cannabis use disorder, substance induced psychotic d/o, anxiety and major depressive disorder; one prior psychiatric hospitalization at [facility] in 2020 with diagnoses of Alcohol Induced Psychotic d/o; no known prior suicidality/aggression/legal/medical hx; bib ems, activated by pt's boss when he reported he felt lightheaded and fainted at work. Psych consult called by intial ed provider who stated pt reported CAH to kill himself. On re-evaluation this morning patient is endorsing thoughts of wanting to hurt himself and some AH of "voices". He also started banging his head against the wall and required a "Code Grey" being called and medication to maintain safety in the ED. Patient's Utox and BAL are negative. Per EM and neurology, patient is medically cleared. He continues to be unsafe for discharge at this time and would benefit from inpatient psychiatry for safe, stabilization and appropriate aftercare planning.    Differential remains broad at this time and includes: unspecified psychotic disorder versus substance induced psychotic d/o versus unspecified mood disorder     Plan:   -Per EM provider, patient is medically cleared at this time  -Admit to inpatient psychiatry on 9.27 involuntary status  -Patient will need 1:1 on while inpatient unit for safety  -Continue patient outpatient HTN meds, as documented per EM  -Consider started olanzapine 10 mg at bedtime to target psychotic symptoms  -Recommending Haldol 5 mg q6 PO/IM PRN agitation, Ativan q6 PO/IM PRN agitation, Benadryl 50 mg PO/IM q6 PRN agitation Patient seen and reassessed this morning. Chart reviewed. Spoke at length with EM provider. Patient this morning has been selectively mute with certain staff. He was briefly thought to be catatonic and given lorazepam 1 mg at 8:10 AM with good effect. Pacific Language  #[id number] utilized. Patient endorses AH of "voices" as well as low mood. He also endorses having "thoughts to hurt himself". Earlier this morning patient got up from stretcher and started banging his head against the wall, Chuck Potts was called. Patient was seen by neurology and "cleared". Exam is limited as he answers in short phrases and mumbles. Endorses SI but denies intent or specific plan. Cannot elaborate or engage in safety planning at this time. Denies any HI/violent/aggressive ideation.    On mental status exam, patient appears dishevelled. He intermittently closes his eyes at times during interview and at other times is staring intensely. He appears to be responding to internal stimuli at times. At this moment he is calm and mostly cooperative. Assessment of gait was deferred. No abnormal movements noted. Speech is soft, mumbled and not very productive. There is increased response latency. Mood reported as "bad"; affect appears dysphoric, blunted in range, affect is appropriate to content discussed. TP is non-linear, illogical, TC is remarkable for suicidal ideation without intent or plan and thoughts of self harm without suicidal intent. Denies any homicidal, violent, or aggressive ideation, intent or plan. Endorses AH of "voices". Denies VH or any other perceptual disturbances. Denies any paranoid ideation. No overt delusions elicited. Alert and oriented x4. Cognition is grossly intact. Insight and judgment are poor.    Impression/Plan:  Tripp is a 51 year-old  male, Kuwaiti speaking, originally from [facility], single, employed washing dishes at a restaurant, domiciled with four roommates including his brother, per psyckes a hx of etoh use disorders, cannabis use disorder, substance induced psychotic d/o, anxiety and major depressive disorder; one prior psychiatric hospitalization at [facility] in 2020 with diagnoses of Alcohol Induced Psychotic d/o; no known prior suicidality/aggression/legal/medical hx; bib ems, activated by pt's boss when he reported he felt lightheaded and fainted at work. Psych consult called by intial ed provider who stated pt reported CAH to kill himself. On re-evaluation this morning patient is endorsing thoughts of wanting to hurt himself and some AH of "voices". He also started banging his head against the wall and required a "Code Grey" being called and medication to maintain safety in the ED. Patient's Utox and BAL are negative. Per EM and neurology, patient is medically cleared. He continues to be unsafe for discharge at this time and would benefit from inpatient psychiatry for safe, stabilization and appropriate aftercare planning.    Differential remains broad at this time and includes: unspecified psychotic disorder versus substance induced psychotic d/o versus unspecified mood disorder     Plan:   -Per EM provider, patient is medically cleared at this time  -Admit to inpatient psychiatry on 9.27 involuntary status  -Patient will need 1:1 on while inpatient unit for safety  -Continue patient outpatient HTN meds, as documented per EM, specifically: amlodipine 5 mg daily; metoprolol 25 mg daily; and losartan 50 mg daily  -Consider started olanzapine 10 mg at bedtime to target psychotic symptoms  -Recommending Haldol 5 mg q6 PO/IM PRN agitation, Ativan q6 PO/IM PRN agitation, Benadryl 50 mg PO/IM q6 PRN agitation

## 2021-12-10 NOTE — ED BEHAVIORAL HEALTH NOTE - BEHAVIORAL HEALTH NOTE
===================  PRE-HOSPITAL COURSE  ===================  SOURCE:  RN ____ and secondhand ED documentation.  DETAILS:  Per chart, patient     ============  ED COURSE  ============  SOURCE:  RN ____ and secondhand ED documentation.  ARRIVAL:  Per chart and RN, patient arrived ______. Per RN, patient was _______ upon arrival, and ______ with triage process.  BELONGINGS:  Per RN, patient arrived with ______. All belongings were provided to hospital security, and patient is currently in a gown with a 1:1 staff member.  BEHAVIOR: RN described patient to be _______, presenting with ________ thought process, (AAOx3?), presenting with ____ mood and ____ affect, remains in ______ behavioral and impulse control, is ____ violent/aggressive. RN stated that the patient is (SI/HI/A/VH?). RN stated that there ______ visible marks, bruises, or lacerations on the body. RN stated that the patient appears to be _____-groomed, maintains ____ hygiene, and reports _____ ADLs, ambulates __________ (without assistance?).  TREATMENT:  Per chart and RN, patient (did/did not) PRN medications.  VISITORS:  Per RN,     ========================  FOR EACH COLLATERAL   ========================  NAME:  NUMBER:  RELATIONSHIP:  RELIABILITY:  COMMENTS:     ========================  PATIENT DEMOGRAPHICS:  ========================  HPI  BASELINE FUNCTIONING:  DATE HPI STARTED:  DECOMPENSATION:  SUICIDALITY  VIOLENCE:    SUBSTANCE:          ========================  PAST PSYCHIATRIC HISTORY  ========================  DATE PAST PSYCHIATRIC HISTORY STARTED:  MAIN PSYCHIATRIC DIAGNOSIS:  PSYCHIATRIC HOSPITALIZATIONS:    PRIOR ILLNESS:  SUICIDALITY:    VIOLENCE:    SUBSTANCE USE:       ==============  OTHER HISTORY  ==============  CURRENT MEDICATION:    MEDICAL HISTORY:    ALLERGIES:     LEGAL ISSUES:  FIREARM ACCESS:  SOCIAL HISTORY:     COVID Exposure Screen- collateral (i.e. third-party, chart review, belongings, etc; include EMS and ED staff)  1.        *Has the patient had a COVID-19 test in the last 90 days?  (  ) Yes   (  ) No   (  ) Unknown- Reason: _____  IF YES PROCEED TO QUESTION #2. IF NO OR UNKNOWN, PLEASE SKIP TO QUESTION #3.  2.        Date of test(s) and result(s): ________  3.        *Has the patient tested positive for COVID-19 antibodies? (  ) Yes   (  ) No   (  ) Unknown- Reason: _____  IF YES PROCEED TO QUESTION #4. IF NO or UNKNOWN, PLEASE SKIP TO QUESTION #5.  4.        Date of positive antibody test: ________  5.        *Has the patient received 2 doses of the COVID-19 vaccine? (  ) Yes   (  ) No   (  ) Unknown- Reason: _____  IF YES PROCEED TO QUESTION #6. IF NO or UNKNOWN, PLEASE SKIP TO QUESTION #7.  6.         Date of second dose: ________  7.        *In the past 10 days, has the patient been around anyone with a positive COVID-19 test?* (  ) Yes   (  ) No   (  ) Unknown- Reason: __  IF YES PROCEED TO QUESTION #8. IF NO or UNKNOWN, PLEASE SKIP TO QUESTION #13.  8.        Was the patient within 6 feet of them for at least 15 minutes? (  ) Yes   (  ) No   (  ) Unknown- Reason: _____  9.        Did the patient provide care for them? (  ) Yes   (  ) No   (  ) Unknown- Reason: ______  10.     Did the patient have direct physical contact with them (touched, hugged, or kissed them)? (  ) Yes   (  ) No	(  ) Unknown- Reason: __  11.     Did the patient share eating or drinking utensils with them? (  ) Yes   (  ) No	(  ) Unknown- Reason: ____  12.     Did they sneeze, cough, or somehow get respiratory droplets on the patient? (  ) Yes   (  ) No	(  ) Unknown- Reason: ______  13.     *Has the patient been out of [place] within the past 10 days?* (  ) Yes   (  ) No   (  ) Unknown- Reason: _____  IF YES PLEASE ANSWER THE FOLLOWING QUESTIONS:  14.     Which state/country did they go to? ______  15.     Were they there over 24 hours? (  ) Yes   (  ) No	(  ) Unknown- Reason: ______  16.     Date of return to [facility]: ______ ===================  PRE-HOSPITAL COURSE  ===================  SOURCE:  RN and secondhand ED documentation.  DETAILS:  Per chart, patient was BIB EMS activated by self due to pt reporting feeling "hot all over"     ============  ED COURSE  ============  SOURCE:  RN ____ and secondhand ED documentation.  ARRIVAL:  Per chart and RN, patient arrived ______. Per RN, patient was _______ upon arrival, and ______ with triage process.  BELONGINGS:  Per RN, patient arrived with ______. All belongings were provided to hospital security, and patient is currently in a gown with a 1:1 staff member.  BEHAVIOR: RN described patient to be _______, presenting with ________ thought process, (AAOx3?), presenting with ____ mood and ____ affect, remains in ______ behavioral and impulse control, is ____ violent/aggressive. RN stated that the patient is (SI/HI/A/VH?). RN stated that there ______ visible marks, bruises, or lacerations on the body. RN stated that the patient appears to be _____-groomed, maintains ____ hygiene, and reports _____ ADLs, ambulates __________ (without assistance?).  TREATMENT:  Per chart and RN, patient (did/did not) PRN medications.  VISITORS:  Per RN,     ========================  FOR EACH COLLATERAL   ========================  NAME:  NUMBER:  RELATIONSHIP:  RELIABILITY:  COMMENTS:     ========================  PATIENT DEMOGRAPHICS:  ========================  HPI  BASELINE FUNCTIONING:  DATE HPI STARTED:  DECOMPENSATION:  SUICIDALITY  VIOLENCE:    SUBSTANCE:          ========================  PAST PSYCHIATRIC HISTORY  ========================  DATE PAST PSYCHIATRIC HISTORY STARTED:  MAIN PSYCHIATRIC DIAGNOSIS:  PSYCHIATRIC HOSPITALIZATIONS:    PRIOR ILLNESS:  SUICIDALITY:    VIOLENCE:    SUBSTANCE USE:       ==============  OTHER HISTORY  ==============  CURRENT MEDICATION:    MEDICAL HISTORY:    ALLERGIES:     LEGAL ISSUES:  FIREARM ACCESS:  SOCIAL HISTORY:     COVID Exposure Screen- collateral (i.e. third-party, chart review, belongings, etc; include EMS and ED staff)  1.        *Has the patient had a COVID-19 test in the last 90 days?  (  ) Yes   (  ) No   (  ) Unknown- Reason: _____  IF YES PROCEED TO QUESTION #2. IF NO OR UNKNOWN, PLEASE SKIP TO QUESTION #3.  2.        Date of test(s) and result(s): ________  3.        *Has the patient tested positive for COVID-19 antibodies? (  ) Yes   (  ) No   (  ) Unknown- Reason: _____  IF YES PROCEED TO QUESTION #4. IF NO or UNKNOWN, PLEASE SKIP TO QUESTION #5.  4.        Date of positive antibody test: ________  5.        *Has the patient received 2 doses of the COVID-19 vaccine? (  ) Yes   (  ) No   (  ) Unknown- Reason: _____  IF YES PROCEED TO QUESTION #6. IF NO or UNKNOWN, PLEASE SKIP TO QUESTION #7.  6.         Date of second dose: ________  7.        *In the past 10 days, has the patient been around anyone with a positive COVID-19 test?* (  ) Yes   (  ) No   (  ) Unknown- Reason: __  IF YES PROCEED TO QUESTION #8. IF NO or UNKNOWN, PLEASE SKIP TO QUESTION #13.  8.        Was the patient within 6 feet of them for at least 15 minutes? (  ) Yes   (  ) No   (  ) Unknown- Reason: _____  9.        Did the patient provide care for them? (  ) Yes   (  ) No   (  ) Unknown- Reason: ______  10.     Did the patient have direct physical contact with them (touched, hugged, or kissed them)? (  ) Yes   (  ) No	(  ) Unknown- Reason: __  11.     Did the patient share eating or drinking utensils with them? (  ) Yes   (  ) No	(  ) Unknown- Reason: ____  12.     Did they sneeze, cough, or somehow get respiratory droplets on the patient? (  ) Yes   (  ) No	(  ) Unknown- Reason: ______  13.     *Has the patient been out of [place] within the past 10 days?* (  ) Yes   (  ) No   (  ) Unknown- Reason: _____  IF YES PLEASE ANSWER THE FOLLOWING QUESTIONS:  14.     Which state/country did they go to? ______  15.     Were they there over 24 hours? (  ) Yes   (  ) No	(  ) Unknown- Reason: ______  16.     Date of return to [facility]: ______ ===================  PRE-HOSPITAL COURSE  ===================  SOURCE:  RN and secondhand ED documentation.  DETAILS:  Per chart, patient was BIB EMS activated by self due to pt reporting feeling "hot all over" and experiencing AH that was telling them that the voices were trying to kill him.      ============  ED COURSE  ============  SOURCE:  RN and secondhand ED documentation.  ARRIVAL:  Per chart and RN, patient arrived via EMS.   BELONGINGS:  No belongings of note.  BEHAVIOR: RN described patient to be currently calm and cooperative, presenting with linear thought process and thought content WNL, required Moldovan telephonic interpretation services, is AAOx3, presenting with stable mood and appropriate affect, remains in good behavioral and impulse control, is not currently violent/aggressive. RN stated that the patient is reporting non-command AH, stating that the voices are trying to kill him, and denies SI/HI/VH. RN stated that there are no visible marks, bruises, or lacerations on the body. RN stated that the patient appears to be well-groomed.  TREATMENT:  Per chart and RN, patient did not require PRN medications.  VISITORS:  None     ========================  FOR EACH COLLATERAL   ========================  NAME: Gabo Russo   NUMBER: [phone]  RELATIONSHIP: Brother  RELIABILITY: Limited knowledge in pt's current outpatient tx.   COMMENTS: [facility] spoke with pt's brother for 3rd party collateral. Patient is a 51M domiciled with his brother at home, currently employed as a , single, without children, non-caregiver. Patient was BIB EMS due to feeling hot and experiencing worsening non-command AH, states that the voices are trying to kill him, denies that the voices are instructing him to kill himself. Brother stated that the pt typically presents this way when he is out of his outpatient psychiatric medication, though brother was unable to provide names of meds and psychiatrist. Brother states that he is also unsure if pt keeps up with outpatient treatment regularly, as [facility] informed brother that the pt would need to attend appointments with psychiatrist in order to obtain refills on meds. Brother adds that the pt becomes more depressed when the voices becomes stronger. Brother denies pt has expressed SI/HI/VH, denies recent and past SA/selfinjury, denies knowledge of past IPP admissions, denies aggression/violent, reports some knowledge of substance abuse however does not know what specific ones, denies legal hx, denied access to firearms at home. Brother states that he is not concerned pt will be a danger to himself/others if discharged, however expressed concern that the pt does not have ample supply of medication. [facility] provided psychoeducation to pt's brother about what tx is offered from ED psychiatrically, and that psychotropic medications need to be managed from outpatient psychiatrist that can follow up with him in the community. Brother expressed limited understanding, despite persistent education provided by [facility].     COVID Exposure Screen- collateral (i.e. third-party, chart review, belongings, etc; include EMS and ED staff)  1.        *Has the patient had a COVID-19 test in the last 90 days?  (  ) Yes   (  ) No   ( x ) Unknown- Reason: Unable to assess.  IF YES PROCEED TO QUESTION #2. IF NO OR UNKNOWN, PLEASE SKIP TO QUESTION #3.  2.        Date of test(s) and result(s): ________  3.        *Has the patient tested positive for COVID-19 antibodies? (  ) Yes   (  ) No   (x  ) Unknown- Reason: _Unable to assess.  IF YES PROCEED TO QUESTION #4. IF NO or UNKNOWN, PLEASE SKIP TO QUESTION #5.  4.        Date of positive antbody test: ________  5.        *Has the patient received 2 doses of the COVID-19 vaccine? (  ) Yes   (  ) No   ( x ) Unknown- Reason: Unable to assess.  IF YES PROCEED TO QUESTION #6. IF NO or UNKNOWN, PLEASE SKIP TO QUESTION #7.  6.         Date of second dose: ________  7.        *In the past 10 days, has the patient been around anyone with a positive COVID-19 test?* (  ) Yes   (  ) No   ( x ) Unknown- Reason: Unable to assess.  IF YES PROCEED TO QUESTION #8. IF NO or UNKNOWN, PLEASE SKIP TO QUESTION #13.  8.        Was the patient within 6 feet of them for at least 15 minutes? (  ) Yes   (  ) No   (  ) Unknown- Reason: _____  9.        Did the patient provide care for them? (  ) Yes   (  ) No   (  ) Unknown- Reason: ______  10.     Did the patient have direct physical contact with them (touched, hugged, or kissed them)? (  ) Yes   (  ) No    (  ) Unknown- Reason: __  11.     Did the patient share eating or drinking utensils with them? (  ) Yes   (  ) No    (  ) Unknown- Reason: ____  12.     Did they sneeze, cough, or somehow get respiratory droplets on the patient? (  ) Yes   (  ) No    (  ) Unknown- Reason: ______  13.     *Has the patient been out of [place] within the past 10 days?* (  ) Yes   (  ) No   ( x ) Unknown- Reason: Unable to assess.  IF YES PLEASE ANSWER THE FOLLOWING QUESTIONS:  14.     Which state/country did they go to? ______  15.     Were they there over 24 hours? (  ) Yes   (  ) No    (  ) Unknown- Reason: ______  16.     Date of return to [facility]: ____

## 2021-12-10 NOTE — ED BEHAVIORAL HEALTH ASSESSMENT NOTE - DESCRIPTION
see bh note    covid screening (pt): did not answer    ISTOP: no results     Psyckes : no results for medications denies as per hpi

## 2021-12-11 PROCEDURE — 99232 SBSQ HOSP IP/OBS MODERATE 35: CPT

## 2021-12-11 RX ADMIN — Medication 25 MILLIGRAM(S): at 20:59

## 2021-12-11 RX ADMIN — ARIPIPRAZOLE 2 MILLIGRAM(S): 15 TABLET ORAL at 09:42
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
## 2021-12-11 NOTE — BH INPATIENT PSYCHIATRY PROGRESS NOTE - CURRENT MEDICATION
MEDICATIONS  (STANDING):  amLODIPine   Tablet 5 milliGRAM(s) Oral daily  ARIPiprazole 2 milliGRAM(s) Oral daily  metoprolol tartrate 25 milliGRAM(s) Oral two times a day    MEDICATIONS  (PRN):  acetaminophen     Tablet .. 650 milliGRAM(s) Oral every 6 hours PRN Moderate Pain (4 - 6), Severe Pain (7 - 10)  haloperidol     Tablet 2 milliGRAM(s) Oral every 6 hours PRN Agitation  haloperidol    Injectable 2 milliGRAM(s) IntraMuscular once PRN Agitation  LORazepam     Tablet 1 milliGRAM(s) Oral every 6 hours PRN Anxiety/ agitation  LORazepam   Injectable 1 milliGRAM(s) IntraMuscular once PRN Agitation  traZODone 50 milliGRAM(s) Oral at bedtime PRN Insomnia

## 2021-12-11 NOTE — PSYCHIATRIC REHAB INITIAL EVALUATION - NSBHALCSUBTREAT_PSY_ALL_CORE
Pt reported being in treatment with an out-patient provider, however, he was unable to recall doctors name.

## 2021-12-11 NOTE — BH INPATIENT PSYCHIATRY PROGRESS NOTE - NSBHCHARTREVIEWVS_PSY_A_CORE FT
Vital Signs Last 24 Hrs  T(C): 36.7 (12-11-21 @ 05:46), Max: 36.8 (12-10-21 @ 18:07)  T(F): 98.1 (12-11-21 @ 05:46), Max: 98.3 (12-10-21 @ 18:07)  HR: 62 (12-10-21 @ 20:06) (62 - 76)  BP: 121/70 (12-10-21 @ 20:06) (121/70 - 132/67)  BP(mean): --  RR: 18 (12-10-21 @ 18:07) (17 - 18)  SpO2: 97% (12-10-21 @ 15:16) (97% - 97%)    Orthostatic VS  12-11-21 @ 05:46  Lying BP: 112/57 HR: 66  Sitting BP: --/-- HR: --  Standing BP: --/-- HR: --  Site: --  Mode: --  Orthostatic VS  12-10-21 @ 18:07  Lying BP: --/-- HR: --  Sitting BP: 131/85 HR: 80  Standing BP: 118/88 HR: 93  Site: --  Mode: --

## 2021-12-11 NOTE — PSYCHIATRIC REHAB INITIAL EVALUATION - NSBHALCSUBUSE_PSY_ALL_CORE
Per medical records, pt has a history of etoh use disorders, and cannabis use disorder. However, during assessment pt denied using substance or ever drinking etoh./No

## 2021-12-11 NOTE — BH INPATIENT PSYCHIATRY PROGRESS NOTE - NSBHASSESSSUMMFT_PSY_ALL_CORE
51M, Lithuanian-speaking, originally from [facility], single, employed washing dishes at a restaurant, domiciled with four roommates including his brother, per psyckes a hx of etoh use disorders, cannabis use disorder, substance induced psychotic d/o, anxiety and major depressive disorder; one prior psychiatric hospitalization at [facility] in 2020 with diagnoses of Alcohol Induced Psychotic d/o; no known prior suicidality/aggression/legal/medical hx; bib ems, activated by pt's boss when he reported he felt lightheaded and fainted at work. Psych consult called by intial ed provider who stated pt reported CAH to kill himself. Transferred to [facility] for CAH/psychosis.     On assessment, patient is sleepy, with difficulty staying awake, uncooperative, and minimally responsive to questioning. Patient says that he does not feel well. States he feels dizzy but does not elaborate. Does not respond to questions w/r/t/ psychiatric symptoms. Overall impression is substance-induced psychotic disorder vs. substance-induced mood disorder vs. primary psychotic disorder.     Requires inpatient admission for safety, stabilization, and safe discharge planning.     12/11: Encounter carried in shared language, Lithuanian. Patient reports he came to the hospital after he fainted at work. In the ED pt reported suicidal ideation, however at this time he denies any SIIP and contracts for safety. Pt endorses concern that one of his roommates, Jorge, recorded him and uploaded the video online where everyone can see it in order to hurt him. When asked what was he doing in the video pt is unable to elaborate, pt is also unable to explain why Jorge would want to hurt him or damage his reputation. Pt states he believes other people are also behind this but doesn't know who they are. Pt also endorses active AH, denies command AH, denies VH. Will continue current regimen. PT saw him today for unsteady gait.     PLAN:   1. Legal: Admitted on 9.27 status  2. Safety: No reported SI/SIB/HI/VI currently on unit; continue routine observation.   3. Psychiatric: Abilify 2mg PO daily for psychosis. PRN Haldol 2mg PO/IM Q6H and Ativan 1mg PO/IM for agitation, PRN Trazodone 50mg PO QHS for insomnia  4. Therapy: individual therapy, group & milieu therapy  5. Medical: Amlodipine 5mg daily and Metoprolol 25mg BID continued with hold parameters.    6. Obtain collateral  7. Disposition: When stable

## 2021-12-11 NOTE — PSYCHIATRIC REHAB INITIAL EVALUATION - NSBHPRRECOMMEND_PSY_ALL_CORE
Pt is a 51 Malian-speaking man originally from [facility] who initially presented to medical ED after he fainted at work. Pt was transferred to [facility] for treatment of psychosis including CAH to kill himself. Writer met with patient and introduced self, psychiatric rehabilitation staff and services. Pt was seen with use of video  service (ID # [id number] and ID #[id number]). Pt was noted to be a poor historian. He appeared disorganized.  Pt minimized illness symptoms and often spoke about being a good person who everyone likes. Pt denied ever drinking ETOH.   Pt demonstrated good impulse control during assessment, displayed poor insight and poor judgment into his symptoms and treatment at this time.  Writer encouraged patient to attend psychiatric rehabilitation activities and engage in treatment.  Psychiatric Rehabilitation staff will continue to engage patient daily in order to develop therapeutic rapport. Writer and patient were unable to establish a collaborative psychiatric rehabilitation goal, therefore one will be selected for him.

## 2021-12-11 NOTE — BH INPATIENT PSYCHIATRY PROGRESS NOTE - NSBHFUPINTERVALHXFT_PSY_A_CORE
Patient seen for for f/u of psychosis, thought to be substance induced. Encounter carried in shared language, Kazakh. Patient reports he came to the hospital after he fainted at work. In the ED pt reported suicidal ideation, however at this time he denies any SIIP and contracts for safety. Pt endorses concern that one of his roommates, Jorge, recorded him and uploaded the video online where everyone can see it in order to hurt him. When asked what was he doing in the video pt is unable to elaborate, pt is also unable to explain why Jorge would want to hurt him or damage his reputation. Pt states he believes other people are also behind this but doesn't know who they are. Pt also endorses active AH, denies command AH, denies VH. Will continue current regimen.

## 2021-12-11 NOTE — BH INPATIENT PSYCHIATRY PROGRESS NOTE - PRN MEDS
MEDICATIONS  (PRN):  acetaminophen     Tablet .. 650 milliGRAM(s) Oral every 6 hours PRN Moderate Pain (4 - 6), Severe Pain (7 - 10)  haloperidol     Tablet 2 milliGRAM(s) Oral every 6 hours PRN Agitation  haloperidol    Injectable 2 milliGRAM(s) IntraMuscular once PRN Agitation  LORazepam     Tablet 1 milliGRAM(s) Oral every 6 hours PRN Anxiety/ agitation  LORazepam   Injectable 1 milliGRAM(s) IntraMuscular once PRN Agitation  traZODone 50 milliGRAM(s) Oral at bedtime PRN Insomnia

## 2021-12-11 NOTE — PHYSICAL THERAPY INITIAL EVALUATION ADULT - PERTINENT HX OF CURRENT PROBLEM, REHAB EVAL
Patient is 51 year old male admitted with history of hx of etoh use disorders, cannabis use disorder, substance induced psychotic d/o, anxiety and major depressive disorder;  BIBEM to [place] ED, activated by pt's boss when he reported he felt lightheaded and fainted at work. Psych consult called by intial ED provider who stated pt reported CAH to kill himself. Transferred to [facility] for CAH/psychosis, referred to PT for fall prevention.

## 2021-12-11 NOTE — PHYSICAL THERAPY INITIAL EVALUATION ADULT - ADDITIONAL COMMENTS
Patient report lives with room mate and brother in house, 8 stairs to climb, ambulates with out assistive device.

## 2021-12-12 ENCOUNTER — EMERGENCY (EMERGENCY)
Facility: HOSPITAL | Age: 51
LOS: 1 days | Discharge: HOME | End: 2021-12-12
Attending: EMERGENCY MEDICINE | Admitting: EMERGENCY MEDICINE
Payer: COMMERCIAL

## 2021-12-12 VITALS
OXYGEN SATURATION: 100 % | HEART RATE: 130 BPM | DIASTOLIC BLOOD PRESSURE: 98 MMHG | TEMPERATURE: 98 F | SYSTOLIC BLOOD PRESSURE: 172 MMHG | RESPIRATION RATE: 18 BRPM

## 2021-12-12 VITALS
DIASTOLIC BLOOD PRESSURE: 99 MMHG | OXYGEN SATURATION: 100 % | RESPIRATION RATE: 18 BRPM | HEART RATE: 92 BPM | SYSTOLIC BLOOD PRESSURE: 158 MMHG

## 2021-12-12 PROCEDURE — 99232 SBSQ HOSP IP/OBS MODERATE 35: CPT

## 2021-12-12 PROCEDURE — 70450 CT HEAD/BRAIN W/O DYE: CPT | Mod: 26,MA

## 2021-12-12 PROCEDURE — 99284 EMERGENCY DEPT VISIT MOD MDM: CPT

## 2021-12-12 RX ORDER — ACETAMINOPHEN 500 MG
650 TABLET ORAL ONCE
Refills: 0 | Status: COMPLETED | OUTPATIENT
Start: 2021-12-12 | End: 2021-12-12

## 2021-12-12 RX ORDER — HALOPERIDOL DECANOATE 100 MG/ML
2 INJECTION INTRAMUSCULAR ONCE
Refills: 0 | Status: COMPLETED | OUTPATIENT
Start: 2021-12-12 | End: 2021-12-12

## 2021-12-12 RX ADMIN — Medication 650 MILLIGRAM(S): at 18:04

## 2021-12-12 RX ADMIN — Medication 1 MILLIGRAM(S): at 14:55

## 2021-12-12 RX ADMIN — HALOPERIDOL DECANOATE 2 MILLIGRAM(S): 100 INJECTION INTRAMUSCULAR at 14:55

## 2021-12-12 RX ADMIN — Medication 25 MILLIGRAM(S): at 22:50

## 2021-12-12 RX ADMIN — HALOPERIDOL DECANOATE 2 MILLIGRAM(S): 100 INJECTION INTRAMUSCULAR at 14:35

## 2021-12-12 RX ADMIN — Medication 1 MILLIGRAM(S): at 14:35

## 2021-12-12 NOTE — BH INPATIENT PSYCHIATRY PROGRESS NOTE - NSBHFUPINTERVALHXFT_PSY_A_CORE
Patient seen for follow-up for psychosis. Chart reviewed and case discussed with nursing staff. Per staff and EMR, patient refused all meds this morning and has been asking to be discharged. Patient interviewed with the aid of [employer]  service (ID #[id number]). On encounter, patient is tangential. Patient reports that he needs to go as someone came looking for him and he does not feel safe here. He also states that he is expected at work and needs to get out of here. Patient does not respond to questions appropriately and keeps repeating that he needs to leave over and over. Interview terminated.

## 2021-12-12 NOTE — ED ADULT NURSE REASSESSMENT NOTE - NS ED NURSE REASSESS COMMENT FT1
jose completed. security at bedside to transport pt back to [facility]. VSS. spoke with GEORGE Patton on Low 5

## 2021-12-12 NOTE — BH INPATIENT PSYCHIATRY PROGRESS NOTE - NSBHASSESSSUMMFT_PSY_ALL_CORE
51M, Kazakh-speaking, originally from [facility], single, employed washing dishes at a restaurant, domiciled with four roommates including his brother, per psyckes a hx of etoh use disorders, cannabis use disorder, substance induced psychotic d/o, anxiety and major depressive disorder; one prior psychiatric hospitalization at [facility] in 2020 with diagnoses of Alcohol Induced Psychotic d/o; no known prior suicidality/aggression/legal/medical hx; bib ems, activated by pt's boss when he reported he felt lightheaded and fainted at work. Psych consult called by intial ed provider who stated pt reported CAH to kill himself. Transferred to [facility] for CAH/psychosis.     12/12: Patient interviewed with Kazakh translational services. On exam patient is irritable and paranoid, believes people are after him, demanding to leave, refusing meds.     PLAN: Will continue current management--Abilify 2mg PO daily for psychosis. PRN Haldol 2mg PO/IM Q6H and Ativan 1mg PO/IM for agitation, PRN Trazodone 50mg PO QHS for insomnia. Patient encouraged to remain treatment adherent.

## 2021-12-12 NOTE — ED PROVIDER NOTE - PHYSICAL EXAMINATION
Vitals: I have reviewed the patients vital signs  General: Well dressed, well appearing, no acute distress  HEENT: Atraumatic, normocephalic, airway patent  Eyes: EOMI, tracking appropriately  Neck: no tracheal deviation, no JVD  Chest/Lungs: no trauma, symmetric chest rise, speaking in complete sentences, no WOB  Heart: skin and extremities well perfused, tachy rate and reg rhythm  Neuro: A+Ox3, CN grossly intact, PERRL. 5/5 all extremities  MSK: strength at baseline in all extremities, no muscle wasting or atrophy  Skin: no cyanosis, no jaundice, no new emergent lesions

## 2021-12-12 NOTE — ED PROVIDER NOTE - ATTENDING CONTRIBUTION TO CARE
brisa: pt here with admitted to [facility], he was observed to run into the wall at [facility] top of head first.  pt did not have loc, states he has some headache.  not on any anticoagulation.  area of scalp redness but no abrasion.  will ct low liklihood intracranial bleed.    I performed a history and physical exam of the patient and discussed their management with the resident and /or advanced care provider. I reviewed the resident and /or ACP's note and agree with the documented findings and plan of care. My medical decison making and observations are found above.

## 2021-12-12 NOTE — BH INPATIENT PSYCHIATRY PROGRESS NOTE - NSCGIIMPROVESX_PSY_ALL_CORE
3 16 21 DOING WELL ON PF BID AND BROMFINAC QD - TO REDUCE PF TO QD AND CONTINUE BROMFINAC - TO HOLD ON FURTHER REDUCTION AS EDEMA HAS INCREASED IN PAST WHEN STOPPED IN PAST. 4 = No change - symptoms remain essentially unchanged

## 2021-12-12 NOTE — ED PROVIDER NOTE - CLINICAL SUMMARY MEDICAL DECISION MAKING FREE TEXT BOX
brisa: pt here with admitted to [facility], he was observed to run into the wall at [facility] top of head first.  pt did not have loc, states he has some headache.  not on any anticoagulation.  area of scalp redness but no abrasion.  will ct low liklihood intracranial bleed.

## 2021-12-12 NOTE — ED PROVIDER NOTE - OBJECTIVE STATEMENT
52 y/o M not on AC here after witnessed head injury at [facility]. Ran into wall with suicidal intent. No LOC. Some HA presently, feels a little weak. No nausea or vomiting.

## 2021-12-12 NOTE — BH CHART NOTE - NSEVENTNOTEFT_PSY_ALL_CORE
[facility] Inpatient to ED Transfer Summary    Reason for Transfer/Medical Summary: 52yo M, hx of HTN, alcohol use d/o (past), psychosis, evaluated after he ran headfirst into the wall on inpatient unit. Reports he did so with suicidal intent. "I want to hurt myself before someone else does" (translated by Turkmen- [id number]). Endorses photophobia, mild dizziness. Denies nausea/vomiting, confusion. Patient received Haldol 2/Ativan 1 PO around 2:30 for agitation, then additional Haldol 2/Ativan 1 IM around 3:00 after he banged his head. He endorses current active suicidal ideation with plan to bang his head again, unable to contract for safety.       PAST MEDICAL & SURGICAL HISTORY:  HTN    Allergies  penicillin (Unknown)      MEDICATIONS  (STANDING):  amLODIPine   Tablet 5 milliGRAM(s) Oral daily  ARIPiprazole 2 milliGRAM(s) Oral daily  metoprolol tartrate 25 milliGRAM(s) Oral two times a day    MEDICATIONS  (PRN):  acetaminophen     Tablet .. 650 milliGRAM(s) Oral every 6 hours PRN Moderate Pain (4 - 6), Severe Pain (7 - 10)  haloperidol     Tablet 2 milliGRAM(s) Oral every 6 hours PRN Agitation  haloperidol    Injectable 2 milliGRAM(s) IntraMuscular once PRN Agitation  LORazepam     Tablet 1 milliGRAM(s) Oral every 6 hours PRN Anxiety/ agitation  LORazepam   Injectable 1 milliGRAM(s) IntraMuscular once PRN Agitation  traZODone 50 milliGRAM(s) Oral at bedtime PRN Insomnia      Vital Signs Last 24 Hrs  T(C): 36.9 (12 Dec 2021 08:02), Max: 37.4 (11 Dec 2021 15:47)  T(F): 98.4 (12 Dec 2021 08:02), Max: 99.4 (11 Dec 2021 15:47)  HR: 129 (12 Dec 2021 15:18) (95 - 129)  BP: 159/103 (12 Dec 2021 15:18) (152/88 - 159/103)  BP(mean): --  RR: 18 (12 Dec 2021 08:02) (18 - 18)  SpO2: --  CAPILLARY BLOOD GLUCOSE  POCT Blood Glucose.: 83 mg/dL (11 Dec 2021 16:35)    PHYSICAL EXAM:  GENERAL: NAD, malodourous  HEAD:  edematous area on top of head approx 2x10 cm, +erythema, no lacerations/abrasions  EYES: EOMI, PERRLA  CHEST/LUNG: normal WOB      Psychiatry Section:  Psychiatric Summary/[facility] admitting diagnosis: CAH to kill himself, psychosis    Psychiatric Recommendations:  - Patient is NOT psychiatrically cleared and will return to [facility] when medically cleared  - continue Abilify 2mg PO daily for psychosis  - PRN Haldol 2mg PO/IM Q6H and Ativan 1mg PO/IM for agitation  - PRN Trazodone 50mg PO QHS for insomnia  - Please page [phone] prior to transfer back to [facility] for physician handoff    Observation status (check one):   (x) Constant Observation  ( ) Enhanced care  ( ) Routine checks    Risk Status (check all that apply if present):  (x) at risk for suicide/self-injury  ( ) at risk for aggressive behavior  (x) at risk for elopement  ( ) other risk:

## 2021-12-12 NOTE — ED PROVIDER NOTE - PATIENT PORTAL LINK FT
You can access the FollowMyHealth Patient Portal offered by [facility] by registering at the following website: [email or web address]. By joining [employer]’s FollowMyHealth portal, you will also be able to view your health information using other applications (apps) compatible with our system.

## 2021-12-12 NOTE — ED PROVIDER NOTE - NS ED ROS FT
Constitutional: (-) fever (-) vomiting  Eyes/ENT: (-) vision changes, (-) hearing changes  Cardiovascular: (-) chest pain, (-) wheezing  Respiratory: (-) cough, (-) shortness of breath  Gastrointestinal: (-) vomiting, (-) diarrhea, (-) abdominal pain  : (-) dysuria   Musculoskeletal: (-) back pain  Integumentary: (-) rash, (-) edema  Neurological: +HA  Allergic/Immunologic: (-) pruritus  Endocrine: No history of thyroid disease

## 2021-12-12 NOTE — ED ADULT NURSE REASSESSMENT NOTE - NS ED NURSE REASSESS COMMENT FT1
pt received from day RN. pt sleeping comfortably. offered no complains at present. [facility] staff at bedside. pt discharged. awaiting for huddle

## 2021-12-12 NOTE — ED PROVIDER NOTE - PROGRESS NOTE DETAILS
Mesfin: pt resting comfortably, no LOC, feels well. Will be taken back to [facility] with aide. Repeat vitals being performed Mesfin: spoke with psych R2 at [facility], pt accepted back into their care pending transport.

## 2021-12-12 NOTE — ED ADULT NURSE NOTE - OBJECTIVE STATEMENT
Pt. is A&Ox1 brought in from [facility] for SI and head trauma. Pt. states he is experiencing auditory hallucinations telling him to kill himself. Noted raised bump and redness on top of head, no open abrasions. Pt. currently denies SI/HI. Has a history of ETOH and drug use. Pt. has [facility] staff at bedside for continuos observation. Respirations even and unlabored. Safety maintained.

## 2021-12-12 NOTE — BH INPATIENT PSYCHIATRY PROGRESS NOTE - NSBHMETABOLIC_PSY_ALL_CORE_FT
Glucose: POCT Blood Glucose.: 83 mg/dL (12-11-21 @ 16:35)  BP: 126/70 (12-12-21 @ 20:19) (121/70 - 159/103)

## 2021-12-12 NOTE — ED PROVIDER NOTE - NSFOLLOWUPINSTRUCTIONS_ED_ALL_ED_FT
LO QUE NECESITA SABER:    Un traumatismo craneal puede incluir el cuero cabelludo, la delmy, el cráneo o el cerebro y puede variar de leve a grave. Los efectos pueden aparecer inmediatamente después de la lesión o desarrollarse más tarde. Los efectos pueden durar poco tiempo o ser permanentes. Es posible que los médicos quieran revisar manzanares recuperación con el tiempo. El tratamiento puede cambiar a medida que usted se recupera o desarrolla nuevos problemas de john por el traumatismo craneal.    INSTRUCCIONES SOBRE EL MARY HOSPITALARIA:    Llame al número local de emergencias (911 en los [place]), o pídale a alguien que llame si:  •No es posible despertarlo.      •Usted sufre armando convulsión.      •Usted lgoria de reaccionar cuando le hablan o se desmaya.      •Usted tiene visión borrosa o doble.      •Usted arrastra las palabras o se confunde al hablar.      •Usted tiene debilidad en manzanares brazo o pierna, pierde la sensación o presenta nuevos problemas de coordinación.      •Margareth pupilas son más grandes de lo habitual o armando pupila es de tamaño diferente de la otra.      •A usted le sale ousmane o un líquido porfirio de los oídos o la nariz.      Busque atención médica de inmediato si:  •Usted tiene vómitos reiterados o jean marie.      •Se siente confundido.      •El dolor de timothy empeora o se vuelve intenso.      •Usted o armando persona que lo cuida nota que le michele más despertarse que de costumbre.      Llame a manzanares médico si:  •Margareth síntomas swain más de 6 semanas después de la lesión.      •Usted tiene preguntas o inquietudes acerca de manzanares condición o cuidado.      Medicamentos:  •Acetaminofénalivia el dolor y baja la fiebre. Está disponible sin receta médica. Pregunte la cantidad y la frecuencia con que debe tomarlos. Siga las indicaciones. Mayra las etiquetas de todos los demás medicamentos que esté usando para saber si también contienen acetaminofén, o pregunte a manzanares médico o farmacéutico. El acetaminofén puede causar daño en el hígado cuando no se santos de forma correcta. No use más de 4 gramos (4000 miligramos) en total de acetaminofeno en un día.      •Summit margareth medicamentos juliann se le haya indicado.Consulte con manzanares médico si usted nayely que manzanares medicamento no le está ayudando o si presenta efectos secundarios. Infórmele si es alérgico a cualquier medicamento. Mantenga armando lista actualizada de los medicamentos, las vitaminas y los productos herbales que santos. Incluya los siguientes datos de los medicamentos: cantidad, frecuencia y motivo de administración. Traiga con usted la lista o los envases de las píldoras a margareth citas de seguimiento. Lleve la lista de los medicamentos con usted en andres de armando emergencia.      Cuidados personales:  •Descanseo juju actividades tranquilas. Limite manzanares tiempo viendo la televisión, utilizando la computadora o realizando tareas que requieren mucho pensamiento. Regrese lentamente margareth actividades acostumbradas juliann le indiquen. No practique deportes ni juju actividades que puedan provocarle un golpe en la timothy. Pregunte a manzanares médico cuándo puede regresar al deporte.      •Aplique hieloen la timothy de 15 a 20 minutos cada hora o juliann se le indique. Use armando compresa de hielo o ponga hielo triturado en armando bolsa de plástico. Cúbralo con armando toalla antes de aplicarlo sobre manzanares piel. El hielo ayuda a evitar daño al tejido y a disminuir la inflamación y el dolor.      •Solicite que alguien se quede con usted por 24 horas, o juliann se le indique. Esta persona puede monitorearlo para detectar problemas y pedir ayuda si es necesario. Cuando se despierte, analisa persona debe hacerle algunas preguntas cada pocas horas para avelino si usted está pensando con claridad. Un ejemplo es preguntarle manzanares nombre o manzanares dirección.      Prevenga otro traumatismo craneal:  •Use un leela que se ajuste correctamente.Juju esto cuando practica deportes, o isabella armando bicicleta, scooter o patineta. Los cascos ayudan a disminuir el riesgo de un traumatismo craneal grave. Hable con manzanares médico acerca de otras maneras en las que usted puede protegerse si practica deportes.      •Use el cinturón de seguridad cada vez que esté en un automóvil.[place] ayuda a disminuir el riesgo de sufrir un traumatismo craneal si tiene un accidente.      Acuda a la consulta de control con manzanares médico según las indicaciones:Anote margareth preguntas para que se acuerde de hacerlas magui margareth visitas.

## 2021-12-12 NOTE — ED ADULT TRIAGE NOTE - CHIEF COMPLAINT QUOTE
PT brought by EMS from [facility] for suicidal ideation and head trauma. As per EMs PT smacking top of head against a wall multiple times: Pt has bruise to top of head, skin intact. C/O headache, auditory hallucinations and SI. Denies HI, ETOH or drug use. [facility] staff at side for CO.

## 2021-12-12 NOTE — ED ADULT NURSE NOTE - CHIEF COMPLAINT QUOTE
PT brought by EMS from [facility] for suicidal ideation and head trauma. As per EMs PT smacking top of head against a wall multiple times: Pt has bruise to top of head, skin intact. C/O headache, auditory hallucinations and SI. Denies HI, ETOH or drug use. [facility] staff at side for CO.

## 2021-12-13 PROCEDURE — 99232 SBSQ HOSP IP/OBS MODERATE 35: CPT

## 2021-12-13 RX ADMIN — ARIPIPRAZOLE 2 MILLIGRAM(S): 15 TABLET ORAL at 08:40

## 2021-12-13 RX ADMIN — AMLODIPINE BESYLATE 5 MILLIGRAM(S): 2.5 TABLET ORAL at 08:40

## 2021-12-13 RX ADMIN — Medication 25 MILLIGRAM(S): at 08:40

## 2021-12-13 RX ADMIN — Medication 25 MILLIGRAM(S): at 20:27

## 2021-12-13 NOTE — DIETITIAN INITIAL EVALUATION ADULT. - PERTINENT MEDS FT
MEDICATIONS  (STANDING):  amLODIPine   Tablet 5 milliGRAM(s) Oral daily  ARIPiprazole 2 milliGRAM(s) Oral daily  metoprolol tartrate 25 milliGRAM(s) Oral two times a day

## 2021-12-13 NOTE — DIETITIAN INITIAL EVALUATION ADULT. - OTHER INFO
Pt is Citizen of [place] speaking. Writer communicated with Pt in native language. Pt admitted for treatment of Psychosis. H/O ETOH use disorder.  Reports fair appetite/po intake at present. No GI distress noted. Denies chewing/swallowing difficulty. No recent weight changes reported. Obtained food preferences, will implement them. Encouraged po intake, Pt verbalized understanding.

## 2021-12-13 NOTE — BH INPATIENT PSYCHIATRY PROGRESS NOTE - NSBHCHARTREVIEWVS_PSY_A_CORE FT
Vital Signs Last 24 Hrs  T(C): 36.9 (12-13-21 @ 08:20), Max: 36.9 (12-12-21 @ 15:36)  T(F): 98.4 (12-13-21 @ 08:20), Max: 98.4 (12-12-21 @ 15:36)  HR: 105 (12-12-21 @ 22:37) (62 - 130)  BP: 161/106 (12-12-21 @ 22:37) (126/70 - 172/98)  BP(mean): --  RR: 18 (12-12-21 @ 22:11) (18 - 18)  SpO2: 100% (12-12-21 @ 22:11) (100% - 100%)    Orthostatic VS  12-13-21 @ 08:20  Lying BP: 128/76 HR: 74  Sitting BP: 119/80 HR: 77  Standing BP: --/-- HR: --  Site: --  Mode: --  Orthostatic VS  12-12-21 @ 08:02  Lying BP: --/-- HR: --  Sitting BP: 159/88 HR: 91  Standing BP: 143/97 HR: 95  Site: --  Mode: --  Orthostatic VS  12-11-21 @ 20:59  Lying BP: --/-- HR: --  Sitting BP: 102/69 HR: 87  Standing BP: --/-- HR: --  Site: --  Mode: --

## 2021-12-13 NOTE — BH INPATIENT PSYCHIATRY PROGRESS NOTE - NSBHMETABOLIC_PSY_ALL_CORE_FT
BMI:   HbA1c:   Glucose: POCT Blood Glucose.: 83 mg/dL (12-11-21 @ 16:35)    BP: 161/106 (12-12-21 @ 22:37) (121/70 - 161/106)  Lipid Panel:

## 2021-12-13 NOTE — BH INPATIENT PSYCHIATRY PROGRESS NOTE - NSBHASSESSSUMMFT_PSY_ALL_CORE
51M, Tajik-speaking, originally from [facility], single, employed washing dishes at a restaurant, domiciled with four roommates including his brother, per psyckes a hx of etoh use disorders, cannabis use disorder, substance induced psychotic d/o, anxiety and major depressive disorder; one prior psychiatric hospitalization at [facility] in 2020 with diagnoses of Alcohol Induced Psychotic d/o; no known prior suicidality/aggression/legal/medical hx; bib ems, activated by pt's boss when he reported he felt lightheaded and fainted at work. Psych consult called by intial ed provider who stated pt reported CAH to kill himself. Transferred to [facility] for CAH/psychosis.     PLAN: Will continue current management--Abilify 2mg PO daily for psychosis. PRN Haldol 2mg PO/IM Q6H and Ativan 1mg PO/IM for agitation, PRN Trazodone 50mg PO QHS for insomnia. Patient encouraged to remain treatment adherent.

## 2021-12-13 NOTE — BH SOCIAL WORK INITIAL PSYCHOSOCIAL EVALUATION - OTHER PAST PSYCHIATRIC HISTORY (INCLUDE DETAILS REGARDING ONSET, COURSE OF ILLNESS, INPATIENT/OUTPATIENT TREATMENT)
The patient is a 51 year old single male, Salvadorean-speaking, originally from [facility], employed washing dishes at a restaurant, domiciled with four roommates including his brother, history of ETOH use disorders, cannabis use disorder, substance induced psychotic disorder, anxiety and major depressive disorder, one prior psychiatric hospitalization at [facility] in 2020 with diagnosis of Alcohol Induced Psychotic disorder, BIB by EMS to the ED activated by pt.'s boss when he reported that he felt light-headed and fainted at work.  Psych. consult called by initial ED provider who stated that pt. reported CAH to kill himself.  Pt. was transferred to [facility] for [place]. of CAH/psychosis.

## 2021-12-13 NOTE — BH INPATIENT PSYCHIATRY PROGRESS NOTE - NSBHFUPINTERVALHXFT_PSY_A_CORE
Patient seen for follow-up for psychosis. Chart reviewed and case discussed with nursing staff. Patient interviewed with the aid of [employer]  service (ID #[id number]). The patient was preoccupied with discharge. He stated that he is not having any command hallucinations, although he was reluctant to report when he stopped having these hallucinations. The patient denied SI. He reported he previously was hitting his head in the unit because he wanted to go home. He was able to contract for safety and report he would not hurt himself again, would come for help if he felt the urge to. The patient stated he has not been seeing a psychiatrist recently, only reported a remote history of "seeing a female psychiatrist." The patient denied any current AVH, denied SHIIP.

## 2021-12-14 PROCEDURE — 99232 SBSQ HOSP IP/OBS MODERATE 35: CPT

## 2021-12-14 RX ORDER — ARIPIPRAZOLE 15 MG/1
5 TABLET ORAL DAILY
Refills: 0 | Status: DISCONTINUED | OUTPATIENT
Start: 2021-12-14 | End: 2021-12-16

## 2021-12-14 RX ORDER — HALOPERIDOL DECANOATE 100 MG/ML
2 INJECTION INTRAMUSCULAR ONCE
Refills: 0 | Status: DISCONTINUED | OUTPATIENT
Start: 2021-12-14 | End: 2021-12-14

## 2021-12-14 RX ORDER — HALOPERIDOL DECANOATE 100 MG/ML
5 INJECTION INTRAMUSCULAR ONCE
Refills: 0 | Status: COMPLETED | OUTPATIENT
Start: 2021-12-14 | End: 2021-12-14

## 2021-12-14 RX ADMIN — ARIPIPRAZOLE 2 MILLIGRAM(S): 15 TABLET ORAL at 08:07

## 2021-12-14 RX ADMIN — AMLODIPINE BESYLATE 5 MILLIGRAM(S): 2.5 TABLET ORAL at 08:07

## 2021-12-14 RX ADMIN — Medication 1 MILLIGRAM(S): at 13:57

## 2021-12-14 RX ADMIN — HALOPERIDOL DECANOATE 5 MILLIGRAM(S): 100 INJECTION INTRAMUSCULAR at 13:56

## 2021-12-14 RX ADMIN — Medication 25 MILLIGRAM(S): at 08:07

## 2021-12-14 NOTE — BH INPATIENT PSYCHIATRY PROGRESS NOTE - NSBHFUPINTERVALHXFT_PSY_A_CORE
Patient seen for follow-up for psychosis. Chart reviewed and case discussed with nursing staff. Patient interviewed with the aid of [employer]  service (ID #[id number]). Team was unable to secure video , only audio. The patient was preoccupied with being able to see his . He refused to answer any questions appropriately and aborted the evaluation.

## 2021-12-14 NOTE — BH INPATIENT PSYCHIATRY PROGRESS NOTE - NSBHCHARTREVIEWVS_PSY_A_CORE FT
Vital Signs Last 24 Hrs  T(C): 36.3 (12-14-21 @ 05:20), Max: 36.4 (12-13-21 @ 15:36)  T(F): 97.4 (12-14-21 @ 05:20), Max: 97.6 (12-13-21 @ 15:36)  HR: --  BP: --  BP(mean): --  RR: 18 (12-14-21 @ 05:20) (18 - 18)  SpO2: --    Orthostatic VS  12-14-21 @ 05:20  Lying BP: --/-- HR: --  Sitting BP: 169/95 HR: 73  Standing BP: --/-- HR: --  Site: upper left arm  Mode: electronic  Orthostatic VS  12-13-21 @ 19:21  Lying BP: --/-- HR: --  Sitting BP: 125/85 HR: 73  Standing BP: --/-- HR: --  Site: --  Mode: --  Orthostatic VS  12-13-21 @ 08:20  Lying BP: 128/76 HR: 74  Sitting BP: 119/80 HR: 77  Standing BP: --/-- HR: --  Site: --  Mode: --

## 2021-12-14 NOTE — BH INPATIENT PSYCHIATRY PROGRESS NOTE - NSBHMETABOLIC_PSY_ALL_CORE_FT
BMI:   HbA1c:   Glucose: POCT Blood Glucose.: 83 mg/dL (12-11-21 @ 16:35)    BP: 161/106 (12-12-21 @ 22:37) (126/70 - 161/106)  Lipid Panel:

## 2021-12-14 NOTE — BH INPATIENT PSYCHIATRY PROGRESS NOTE - NSBHASSESSSUMMFT_PSY_ALL_CORE
51M, English-speaking, originally from [facility], single, employed washing dishes at a restaurant, domiciled with four roommates including his brother, per psyckes a hx of etoh use disorders, cannabis use disorder, substance induced psychotic d/o, anxiety and major depressive disorder; one prior psychiatric hospitalization at [facility] in 2020 with diagnoses of Alcohol Induced Psychotic d/o; no known prior suicidality/aggression/legal/medical hx; bib ems, activated by pt's boss when he reported he felt lightheaded and fainted at work. Psych consult called by intial ed provider who stated pt reported CAH to kill himself. Transferred to [facility] for CAH/psychosis.     PLAN: Will continue current management--Abilify 2mg PO daily for psychosis. PRN Haldol 2mg PO/IM Q6H and Ativan 1mg PO/IM for agitation, PRN Trazodone 50mg PO QHS for insomnia. Patient encouraged to remain treatment adherent.  Continue CO as patient was unresponsive to evaluation today.

## 2021-12-14 NOTE — BH INPATIENT PSYCHIATRY PROGRESS NOTE - CURRENT MEDICATION
MEDICATIONS  (STANDING):  amLODIPine   Tablet 5 milliGRAM(s) Oral daily  ARIPiprazole 5 milliGRAM(s) Oral daily  metoprolol tartrate 25 milliGRAM(s) Oral two times a day    MEDICATIONS  (PRN):  acetaminophen     Tablet .. 650 milliGRAM(s) Oral every 6 hours PRN Moderate Pain (4 - 6), Severe Pain (7 - 10)  haloperidol     Tablet 2 milliGRAM(s) Oral every 6 hours PRN Agitation  haloperidol    Injectable 2 milliGRAM(s) IntraMuscular once PRN Agitation  LORazepam     Tablet 1 milliGRAM(s) Oral every 6 hours PRN Anxiety/ agitation  LORazepam   Injectable 1 milliGRAM(s) IntraMuscular once PRN Agitation  traZODone 50 milliGRAM(s) Oral at bedtime PRN Insomnia

## 2021-12-15 PROCEDURE — 99232 SBSQ HOSP IP/OBS MODERATE 35: CPT

## 2021-12-15 RX ADMIN — AMLODIPINE BESYLATE 5 MILLIGRAM(S): 2.5 TABLET ORAL at 08:57

## 2021-12-15 RX ADMIN — Medication 25 MILLIGRAM(S): at 20:39

## 2021-12-15 RX ADMIN — Medication 25 MILLIGRAM(S): at 08:57

## 2021-12-15 RX ADMIN — Medication 50 MILLIGRAM(S): at 20:38

## 2021-12-15 RX ADMIN — ARIPIPRAZOLE 5 MILLIGRAM(S): 15 TABLET ORAL at 08:56

## 2021-12-15 NOTE — BH INPATIENT PSYCHIATRY PROGRESS NOTE - NSBHFUPINTERVALHXFT_PSY_A_CORE
Patient is  51 year single  male (who needs  services) with PPH of MDD, anxiety,  polysubstance disorder (alcohol, and cannabis use, negative u-tox on admission), and history of substance induced psychosis. EMS was activated by patient’s boss, after pt reported feeling lightheaded and fainted while at work.  In ED pt reported CAH.  Patient is hospitalized with a primary problem of worsening psychosis.  Patient originally admitted to [facility] on a 9.39 legal status, but was transferred to [facility] yesterday after a few days on [facility] when bed became available.  Prior to transfer to [facility] yesterday patient received IM medications due to attempts to wrap gown around neck (intervened by [facility] staff).  Few days prior patient had been noncompliant with standing medications and was engaged in SIB by head banging.     Chart, medications and labs reviewed, with no acute findings, negative u-tox, head CT resulted unremarkable.  Patient is discussed in treatment team.  Per nursing patient refused his BP medications last night, but compliant with standing medications this morning, no SE. Nursing reports patient very poor sleep last night, up most of the night.  Benitez patient is observed in his room, sleeping in tristan chair accompanied with CO.

## 2021-12-15 NOTE — BH INPATIENT PSYCHIATRY PROGRESS NOTE - NSBHMETABOLIC_PSY_ALL_CORE_FT
BMI:   HbA1c:   Glucose: POCT Blood Glucose.: 83 mg/dL (12-11-21 @ 16:35)    BP: 132/91 (12-14-21 @ 15:11) (126/70 - 161/106)  Lipid Panel:  BMI:   HbA1c:   Glucose: POCT Blood Glucose.: 83 mg/dL (12-11-21 @ 16:35)    BP: 125/79 (12-15-21 @ 08:34) (125/79 - 161/106)  Lipid Panel:

## 2021-12-15 NOTE — BH TREATMENT PLAN - NSTXPSYCHOINTERRN_PSY_ALL_CORE
assess s/s of illness   provide psychoeducation as needed   reality testing   assesss mood and affect
assess s/s of illness   provide psychoeducation as needed   reality testing   assesss mood and affect

## 2021-12-15 NOTE — BH TREATMENT PLAN - NSTXPSYCHOINTERPR_PSY_ALL_CORE
Psych rehab staff will encourage daily group/activity engagement as well as facilitate goal attainment over the next 7 days for improved symptom management.
Psych rehab staff will encourage daily group/activity engagement as well as facilitate goal attainment over the next 7 days for improved symptom management.

## 2021-12-15 NOTE — BH INPATIENT PSYCHIATRY PROGRESS NOTE - NSBHCHARTREVIEWVS_PSY_A_CORE FT
Vital Signs Last 24 Hrs  T(C): 36.9 (12-15-21 @ 06:20), Max: 36.9 (12-14-21 @ 20:50)  T(F): 98.5 (12-15-21 @ 06:20), Max: 98.5 (12-15-21 @ 06:20)  HR: 90 (12-14-21 @ 15:11) (90 - 90)  BP: 132/91 (12-14-21 @ 15:11) (132/91 - 132/91)  BP(mean): --  RR: --  SpO2: --    Orthostatic VS  12-14-21 @ 20:50  Lying BP: --/-- HR: --  Sitting BP: 130/92 HR: 79  Standing BP: 128/93 HR: 88  Site: --  Mode: --  Orthostatic VS  12-14-21 @ 05:20  Lying BP: --/-- HR: --  Sitting BP: 169/95 HR: 73  Standing BP: --/-- HR: --  Site: upper left arm  Mode: electronic  Orthostatic VS  12-13-21 @ 19:21  Lying BP: --/-- HR: --  Sitting BP: 125/85 HR: 73  Standing BP: --/-- HR: --  Site: --  Mode: --  Orthostatic VS  12-13-21 @ 08:20  Lying BP: 128/76 HR: 74  Sitting BP: 119/80 HR: 77  Standing BP: --/-- HR: --  Site: --  Mode: --   Vital Signs Last 24 Hrs  T(C): 36.9 (12-15-21 @ 06:20), Max: 36.9 (12-14-21 @ 20:50)  T(F): 98.5 (12-15-21 @ 06:20), Max: 98.5 (12-15-21 @ 06:20)  HR: 75 (12-15-21 @ 08:34) (75 - 90)  BP: 125/79 (12-15-21 @ 08:34) (125/79 - 132/91)  BP(mean): --  RR: --  SpO2: --    Orthostatic VS  12-14-21 @ 20:50  Lying BP: --/-- HR: --  Sitting BP: 130/92 HR: 79  Standing BP: 128/93 HR: 88  Site: --  Mode: --  Orthostatic VS  12-14-21 @ 05:20  Lying BP: --/-- HR: --  Sitting BP: 169/95 HR: 73  Standing BP: --/-- HR: --  Site: upper left arm  Mode: electronic  Orthostatic VS  12-13-21 @ 19:21  Lying BP: --/-- HR: --  Sitting BP: 125/85 HR: 73  Standing BP: --/-- HR: --  Site: --  Mode: --

## 2021-12-16 DIAGNOSIS — F10.10 ALCOHOL ABUSE, UNCOMPLICATED: ICD-10-CM

## 2021-12-16 DIAGNOSIS — F12.10 CANNABIS ABUSE, UNCOMPLICATED: ICD-10-CM

## 2021-12-16 PROCEDURE — 99232 SBSQ HOSP IP/OBS MODERATE 35: CPT

## 2021-12-16 RX ORDER — ARIPIPRAZOLE 15 MG/1
10 TABLET ORAL DAILY
Refills: 0 | Status: DISCONTINUED | OUTPATIENT
Start: 2021-12-16 | End: 2021-12-17

## 2021-12-16 RX ADMIN — Medication 1 MILLIGRAM(S): at 21:00

## 2021-12-16 RX ADMIN — ARIPIPRAZOLE 10 MILLIGRAM(S): 15 TABLET ORAL at 08:35

## 2021-12-16 RX ADMIN — Medication 25 MILLIGRAM(S): at 08:34

## 2021-12-16 RX ADMIN — Medication 25 MILLIGRAM(S): at 21:00

## 2021-12-16 RX ADMIN — AMLODIPINE BESYLATE 5 MILLIGRAM(S): 2.5 TABLET ORAL at 08:34

## 2021-12-16 NOTE — BH INPATIENT PSYCHIATRY DISCHARGE NOTE - NSBHATTESTSEENBY_PSY_A_CORE
NP without Attending Psychiatrist attending Psychiatrist without NP/Trainee Attending Psychiatrist supervising NP/Trainee, meeting pt...

## 2021-12-16 NOTE — BH INPATIENT PSYCHIATRY DISCHARGE NOTE - NSBHSUICIDESTATUS_PSY_ALL_CORE
On safety assessment on day of discharge, patient has the following risk factors which are nonmodifiable which include: gender, age, chronic mental illness,   Modifiable risk factors: psychosis, treatment non-adherence, substance abuse, impulsivity, inadequate social support. Currently there are no modifiable risk factors that could further be addressed in the inpatient hospital setting as patient denies any depressive sxs, patient is not suicidal with no intent or plan, has improvement in sleep and energy, medication compliant, improvement in manic sxs (such as impulsivity, irritability, intrusiveness, psychomotor agitation).  Protective factors include: denies SIIP, denies HIIP, future oriented, hopeful, willingness to try medication, not depressed, no access to weapons, engaged in treatment, stable residence, support of family, engages in work, close outpatient follow up appointment.   Patient is at chronic higher risk than the general population for suicide because of risk factors as above, however, they can be mitigated by adjusting medications, medication compliance, and continued therapy.  At the time of discharge, patient not an acute danger to self or others

## 2021-12-16 NOTE — BH INPATIENT PSYCHIATRY PROGRESS NOTE - CURRENT MEDICATION
MEDICATIONS  (STANDING):  amLODIPine   Tablet 5 milliGRAM(s) Oral daily  ARIPiprazole 5 milliGRAM(s) Oral daily  metoprolol tartrate 25 milliGRAM(s) Oral two times a day    MEDICATIONS  (PRN):  acetaminophen     Tablet .. 650 milliGRAM(s) Oral every 6 hours PRN Moderate Pain (4 - 6), Severe Pain (7 - 10)  haloperidol     Tablet 2 milliGRAM(s) Oral every 6 hours PRN Agitation  haloperidol    Injectable 2 milliGRAM(s) IntraMuscular once PRN Agitation  LORazepam     Tablet 1 milliGRAM(s) Oral every 6 hours PRN Anxiety/ agitation  LORazepam   Injectable 1 milliGRAM(s) IntraMuscular once PRN Agitation  traZODone 50 milliGRAM(s) Oral at bedtime PRN Insomnia   MEDICATIONS  (STANDING):  amLODIPine   Tablet 5 milliGRAM(s) Oral daily  ARIPiprazole 10 milliGRAM(s) Oral daily  metoprolol tartrate 25 milliGRAM(s) Oral two times a day    MEDICATIONS  (PRN):  acetaminophen     Tablet .. 650 milliGRAM(s) Oral every 6 hours PRN Moderate Pain (4 - 6), Severe Pain (7 - 10)  haloperidol     Tablet 2 milliGRAM(s) Oral every 6 hours PRN Agitation  haloperidol    Injectable 2 milliGRAM(s) IntraMuscular once PRN Agitation  LORazepam     Tablet 1 milliGRAM(s) Oral every 6 hours PRN Anxiety/ agitation  LORazepam   Injectable 1 milliGRAM(s) IntraMuscular once PRN Agitation  traZODone 50 milliGRAM(s) Oral at bedtime PRN Insomnia

## 2021-12-16 NOTE — BH INPATIENT PSYCHIATRY PROGRESS NOTE - NSBHFUPINTERVALHXFT_PSY_A_CORE
Patient is seen in interview room accompanied by CO, with telephonic  (ID: [id number] / [id number]). Patient states that he is wondering when he can leave since he needs to go back to work. His explanation of why he is in the hospital is that he fainted. He fails to answer questions directly, instead focusing on getting back to work. The patient denies SI/HI because he is “trying to relax” and could not harm himself or others. He feels he is getting along well with the staff taking care of him, and he has been more present in the common areas today. The patient denies alcohol use in the last 20 months and says he does not use any other substances. Patient slightly more organized today but continue to need therapeutic interventions and observation.  Continue CO, increase Abilify 10mg daily

## 2021-12-16 NOTE — BH INPATIENT PSYCHIATRY DISCHARGE NOTE - NSBHDCHANDOFFFT_PSY_ALL_CORE
Medication list and discharge summary included discussion of hospital course, treatment, and medications, with phone number left for call back provided to outpatient Psychiatrist at.............  Writer's contact information was provided for any further questions or concerns to contact Pati Rebollar NP at [phone].  Medication list and discharge summary included discussion of hospital course, treatment, and medications, with phone number left for call back provided to outpatient clinic.  Writer's contact information was provided for any further questions or concerns to contact Pati Rebollar NP at [phone].  Medication list and discharge summary included discussion of hospital course, treatment, and medications, with phone number left for call back provided to outpatient clinic.  Writer's contact information was provided for any further questions or concerns to contact Pati Rebollar NP at [phone] or Dr. Wheatley at [phone].

## 2021-12-16 NOTE — BH INPATIENT PSYCHIATRY DISCHARGE NOTE - HPI (INCLUDE ILLNESS QUALITY, SEVERITY, DURATION, TIMING, CONTEXT, MODIFYING FACTORS, ASSOCIATED SIGNS AND SYMPTOMS)
51M, Armenian-speaking, originally from [facility], single, employed washing dishes at a restaurant, domiciled with four roommates including his brother, per benji a hx of etoh use disorders, cannabis use disorder, substance induced psychotic d/o, anxiety and major depressive disorder; one prior psychiatric hospitalization at [facility] in 2020 with diagnoses of Alcohol Induced Psychotic d/o; no known prior suicidality/aggression/legal/medical hx; bib ems to [facility], activated by pt's boss when he reported he felt lightheaded and fainted at work. Psych consult called by intial ed provider who stated pt reported CAH to kill himself. Transferred to [facility] for CAH/psychosis.     On assessment, patient is sleepy, with difficulty staying awake, uncooperative, and minimally responsive to questioning. Patient says that he does not feel well. States he feels dizzy but does not elaborate. Does not respond to questions w/r/t/ psychiatric symptoms. Patient unaware that he is in a psychiatric hospital. Does answer questions about whether he has been in a psychiatric hospital or received psychotropic medication in the past. Patient nods off throughout interview, ultimately leading to termination of the interview.     From ED  Asssessment Note 12/10/2021:   "51y o  male, Welsh speaking, originally from [facility], single, employed washing dishes at a restaurant, domiciled with four roommates including his brother, per brookejennie a hx of etoh use disorders, cannabis use disorder, substance induced psychotic d/o, anxiety and major depressive disorder; one prior psychiatric hospitalization at [facility] in 2020 with diagnoses of Alcohol Induced Psychotic d/o; no known prior suicidality/aggression/legal/medical hx; bib ems, activated by pt's boss when he reported he felt lightheaded and fainted at work. Psych consult called by intial ed provider who stated pt reported CAH to kill himself.   Patient seen with . even with intepreter, he is a poor historian, with increased speech latency, seemingly decreased attention at times, not answering certain questions, staring straight ahead, with an odd smile on his face at times.  His main complaint is that he doesn't feel well physically, specifically that he feels lightheaded and dizzy. He does not have psychiatric complaints initially. On repeated questioning, he admits to hearing voices chronically. He denies they are command, or that they are to harm himself. he denies si.   he cannot describe the voices, saying several times "I just hear voices."   he initially denies seeing a psychiatrist then says he does so, and that he takes medications but cannot name what they are.   he denies recent substance use of any sort.  utox and bal were negative.  head ct neg   see  note for collateral from brother. his reliability was questionable and he seemed not to have details of pt's history. he said pt took medications and hear voices that he was in danger but could not provide specifics."

## 2021-12-16 NOTE — BH INPATIENT PSYCHIATRY DISCHARGE NOTE - NSDCCPCAREPLAN_GEN_ALL_CORE_FT
PRINCIPAL DISCHARGE DIAGNOSIS  Diagnosis: Psychosis  Assessment and Plan of Treatment:       SECONDARY DISCHARGE DIAGNOSES  Diagnosis: Cannabis abuse  Assessment and Plan of Treatment:     Diagnosis: Alcohol abuse  Assessment and Plan of Treatment:

## 2021-12-16 NOTE — BH INPATIENT PSYCHIATRY DISCHARGE NOTE - MODIFICATIONS
Patient initially presented with acute psychosis, including CAH-SI, in context of one prior psychiatric hospitalization and history of alcohol use. Suicidal gesture of tying hospital gown loosely around his neck on ML5 prior to transfer to 4. Continued psychotic symptoms. Subsequent gesture of tying hospital gown around his neck on ML4. Improvement in response to risperidone though limited engagement with others.

## 2021-12-16 NOTE — BH INPATIENT PSYCHIATRY DISCHARGE NOTE - NSDCRECOMMENDMEDICALFT_PSY_ALL_CORE
Encouraged routine follow up for ongoing monitoring and medical management.  Recommend physical exam including EKG and metabolic screen with PCP on ongoing basis for preventative care and to maintain health and wellness.

## 2021-12-16 NOTE — BH INPATIENT PSYCHIATRY DISCHARGE NOTE - HOSPITAL COURSE
Patient was admitted to [facility] inpatient service on 9. 39 legal status. Patient's labs were grossly WNL, toxicology negative on admission and BAL <10. Patient admitted with hx of Etoh use disorders, cannabis use disorder, substance induced psychotic d/o, anxiety and major depressive disorder; one prior psychiatric hospitalization at [facility] in 2020 with diagnoses of Alcohol Induced Psychotic d/o; no known prior suicidality/aggression/legal/medical hx.  Patient was BIB EMS to [place] ED, activated by pt's boss when he reported he felt lightheaded and fainted at work. Psych consult called by initial ED provider who stated pt reported CAH to kill himself. Patient was admitted with primary symptoms of +CAH/psychosis. BPRS 32, Head CT unremarkable on admission.  On initial interview, patient very guarded, very suspicious, paranoid.  Patient was initially admitted to [facility], was transferred to [facility] several days after admission. While on [facility], patient had a made suicide attempt via tying hospital gown around neck, he was aggressive, and appeared extremely psychotic, patient was given IM medications and subsequently transferred to [facility].  While on [facility] patient was placed on CO, exhibited acute safety risk, he was prominently disorganized, and appeared internally stimulated and paranoid.  Several days into his course of treatment on [facility] patient made another gesture to harm self via tying hospital gown around his neck. Patient very aggressive with staff, required IM medications and 4 point restraints.  Patient later reported +CAH, that the devil was telling him to harm himself.  Patient remained on CO for about two weeks, until stable to discontinue.     Patient was initiate Abilify while on [facility] (with minimal response). Due to worsening aggression and psychosis, Abilify was discontinued and patient was switched to Risperdal, titrated to 2mg daily and 4mg qhs, started on Naltrexone 25mg and titrated to 50mg daily.  Medication teaching, risk/benefits discussed regarding EDMONDS Risperdal vs Invega, and Vivitrol. Patient did not give consent for injections.     Patient’s clinical progress was slow but steady. He showed improvement in his symptoms with Risperdal, with a gradual reduction of his internal preoccupation, paranoia, and thought disorganization and aggression.  Patient has responded well to medication regimen, with fair-good restoration of function.  Psychotic process and aggression have subsided. Overall he has remained in good behavioral control and has not required any additional emergent intramuscular medications or seclusion / restraints.   Patient did not attended groups.  He socialized seldomly but appropriately with peers and staff.  The patient was provided with motivational counseling to tackle stressors and enhance coping skills. Patient also given literature on “Personal safety plan” “Relapse prevention plan” Patient was provided with extensive psycho-education regarding importance of compliance with medications. Patient identified protective factors on discharge and was future oriented.   PROCEDURES AND TREATMENT:  >Individual psychotherapy/CBT modality and group therapy  >Milieu therapy and supportive therapy.  >Psychopharmacologic management.  >Motivational counseling to address substance related issues (Alcohol)  Consultations:  Medical: NA    Patient labs were all WNL. Labs include---CBC/Chemistry/LFT/A1C/Lipid. Panel/TSH/CPK/HBA1-C/U-A/U-Tox/EKG    QT/QTC: 314/453ms  Medications at discharge: Risperdal 2mg daily and 4mg qhs for psychosis, Naltrxone 50mg daily for AUD  Problem Pertinent Review of Symptoms/Associated Signs and Symptoms: Patient’s clinical improvement of symptoms led the treatment team to conclude that the patient can be safely discharged to lower level of care to outpatient services.  Psycho education was provided to the patient prior to discharge. The patient was educated about the proper and safe use of medications as well as the risks and benefits of over and under dosing. The patient was told to go to the nearest emergency department or call his PMD or psychiatrist if he experienced any adverse side effects from any of the medications.  Encouraged patient to follow up with all aftercare recommendations, and to maintain medication compliance. Discussed not stopping medications when he feels better, and processed discontinuation symptoms. Patient was informed of risk/benefit of medication, alternative treatment and no treatment.  Patient was educated about side effects of his medications, including EPS, TD.   Patient was provided with emergency phone numbers and were instructed to call 911 or go to the nearest ER for worsening of symptoms, dangerousness to self/others. Patient verbalized understanding.  Based on the above assessment, patient no longer warrants an inpatient psychiatric hospitalization.  Prognosis is guarded, given a history of inpatient psychiatric hospitalizations.  Future risk was minimized before discharge by treatment of acute mood symptoms, maximizing outpatient support, providing relevant patient education, discussing emergency procedures, and ensuring close follow-up.  Although the patient remains at his chronic baseline risk of self-harm, such risk cannot be further ameliorated by continued inpatient treatment and the patient is therefore appropriate for discharge to lower level of care.  Discharge Pan:  -Patient given 4 weeks supply of medications. Risk, benefits and alternatives discussed with patient. Patient verbalized understanding and in accord with aftercare recommendations.   -Patient instructed to call 911 or go to nearest ER in case of emergency.   -Patient given Suicide Prevention Lifeline number [phone] and provided instructions on its use  -Patient will follow up with outpatient appointments.

## 2021-12-16 NOTE — BH INPATIENT PSYCHIATRY PROGRESS NOTE - NSBHMETABOLIC_PSY_ALL_CORE_FT
BMI:   HbA1c:   Glucose: POCT Blood Glucose.: 83 mg/dL (12-11-21 @ 16:35)    BP: 125/79 (12-15-21 @ 08:34) (125/79 - 132/91)  Lipid Panel:

## 2021-12-16 NOTE — BH INPATIENT PSYCHIATRY PROGRESS NOTE - NSBHCHARTREVIEWVS_PSY_A_CORE FT
Vital Signs Last 24 Hrs  T(C): 37 (12-15-21 @ 22:33), Max: 37 (12-15-21 @ 19:52)  T(F): 98.6 (12-15-21 @ 22:33), Max: 98.6 (12-15-21 @ 19:52)  HR: 75 (12-15-21 @ 08:34) (75 - 75)  BP: 125/79 (12-15-21 @ 08:34) (125/79 - 125/79)  BP(mean): --  RR: --  SpO2: --    Orthostatic VS  12-15-21 @ 19:52  Lying BP: --/-- HR: --  Sitting BP: 135/75 HR: 76  Standing BP: 137/95 HR: 89  Site: --  Mode: --  Orthostatic VS  12-14-21 @ 20:50  Lying BP: --/-- HR: --  Sitting BP: 130/92 HR: 79  Standing BP: 128/93 HR: 88  Site: --  Mode: --   Vital Signs Last 24 Hrs  T(C): 36.6 (12-16-21 @ 07:56), Max: 37 (12-15-21 @ 19:52)  T(F): 97.8 (12-16-21 @ 07:56), Max: 98.6 (12-15-21 @ 19:52)  HR: --  BP: --  BP(mean): --  RR: --  SpO2: --    Orthostatic VS  12-16-21 @ 07:56  Lying BP: --/-- HR: --  Sitting BP: 132/88 HR: 83  Standing BP: 147/108 HR: 84  Site: upper left arm  Mode: electronic  Orthostatic VS  12-15-21 @ 19:52  Lying BP: --/-- HR: --  Sitting BP: 135/75 HR: 76  Standing BP: 137/95 HR: 89  Site: --  Mode: --  Orthostatic VS  12-14-21 @ 20:50  Lying BP: --/-- HR: --  Sitting BP: 130/92 HR: 79  Standing BP: 128/93 HR: 88  Site: --  Mode: --

## 2021-12-16 NOTE — BH INPATIENT PSYCHIATRY PROGRESS NOTE - NSBHASSESSSUMMFT_PSY_ALL_CORE
51M, Turkish-speaking, originally from [place], single, employed washing dishes at a restaurant, domiciled with four roommates including his brother, per psyckes a hx of etoh use disorders, cannabis use disorder, substance induced psychotic d/o, anxiety and major depressive disorder; one prior psychiatric hospitalization at [facility] in 2020 with diagnoses of Alcohol Induced Psychotic d/o; no known prior suicidality/aggression/legal/medical hx; bib ems, activated by pt's boss when he reported he felt lightheaded and fainted at work. Psych consult called by intial ed provider who stated pt reported CAH to kill himself. Transferred to [facility] for CAH/psychosis.     PLAN:   Will continue current management--Increase Abilify 10mg PO daily for psychosis.   PRN Haldol 2mg PO/IM Q6H and Ativan 1mg PO/IM for agitation, PRN Trazodone 50mg PO QHS for insomnia. Patient encouraged to remain treatment adherent.  Continue CO as patient was unresponsive to evaluation today.

## 2021-12-16 NOTE — BH INPATIENT PSYCHIATRY DISCHARGE NOTE - NSBHDCBILLING_PSY_ALL_CORE
[id number] (Hospital discharge day management; 30 min or less) [id number] (Hospital discharge day management; more than 30 min)

## 2021-12-16 NOTE — BH INPATIENT PSYCHIATRY DISCHARGE NOTE - NSBHDCMEDICALFT_PSY_A_CORE
No pertinent medical issues. At the time of this treatment summary, the patient has not had any acute medical changes during his hospitalization. There have been no medical consultations. Patient was discharge with COVID 19 negative results. No cough, SOB, CP, or fever at time of discharge. Vitals WNL.

## 2021-12-16 NOTE — BH SCALES AND SCREENS - NSBPRSGRANDIOS_PSY_ALL_CORE
3 = Mild – e.g., definitely inflated self-esteem or exaggerates talents somewhat out of proportion to the circumstances

## 2021-12-16 NOTE — BH INPATIENT PSYCHIATRY DISCHARGE NOTE - OTHER PAST PSYCHIATRIC HISTORY (INCLUDE DETAILS REGARDING ONSET, COURSE OF ILLNESS, INPATIENT/OUTPATIENT TREATMENT)
The patient is a 51 year old single male, Latvian-speaking, originally from [facility], employed washing dishes at a restaurant, domiciled with four roommates including his brother, history of ETOH use disorders, cannabis use disorder, substance induced psychotic disorder, anxiety and major depressive disorder, one prior psychiatric hospitalization at [facility] in 2020 with diagnosis of Alcohol Induced Psychotic disorder, BIB by EMS to the ED activated by pt.'s boss when he reported that he felt light-headed and fainted at work.  Psych. consult called by initial ED provider who stated that pt. reported CAH to kill himself.  Pt. was transferred to [facility] for [place]. of CAH/psychosis.

## 2021-12-16 NOTE — BH INPATIENT PSYCHIATRY DISCHARGE NOTE - CASE SUMMARY
51M, Setswana-speaking, originally from [facility], single, employed washing dishes at a restaurant, domiciled with four roommates including his brother, per psyckes a hx of etoh use disorders, cannabis use disorder, substance induced psychotic d/o, anxiety and major depressive disorder; one prior psychiatric hospitalization at [facility] in 2020 with diagnoses of Alcohol Induced Psychotic d/o; no known prior suicidality/aggression/legal/medical hx; bib ems to [place] ED, activated by pt's boss when he reported he felt lightheaded and fainted at work. Psych consult called by intial ed provider who stated pt reported CAH to kill himself. Transferred to [facility] for CAH/psychosis.     Patient was admitted to [facility] inpatient service on 9. 39 legal status. Patient's labs were grossly WNL, toxicology negative on admission and BAL <10. Patient admitted with hx of Etoh use disorders, cannabis use disorder, substance induced psychotic d/o, anxiety and major depressive disorder; one prior psychiatric hospitalization at [facility] in 2020 with diagnoses of Alcohol Induced Psychotic d/o; no known prior suicidality/aggression/legal/medical hx.  Patient was BIB EMS to [place] ED, activated by pt's boss when he reported he felt lightheaded and fainted at work. Psych consult called by initial ED provider who stated pt reported CAH to kill himself. Patient was admitted with primary symptoms of +CAH/psychosis. BPRS 32, Head CT unremarkable on admission.  On initial interview, patient very guarded, very suspicious, paranoid.  Patient was initially admitted to [facility], was transferred to [facility] several days after admission. While on [facility], patient had a made suicide attempt via tying hospital gown around neck, he was aggressive, and appeared extremely psychotic, patient was given IM medications and subsequently transferred to [facility].  While on [facility] patient was placed on CO, exhibited acute safety risk, he was prominently disorganized, and appeared internally stimulated and paranoid.  Several days into his course of treatment on [facility] patient made another gesture to harm self via tying hospital gown around his neck. Patient very aggressive with staff, required IM medications and 4 point restraints.  Patient later reported +CAH, that the devil was telling him to harm himself.  Patient remained on CO for about two weeks, until stable to discontinue.     Patient was initiate Abilify while on [facility] (with minimal response). Due to worsening aggression and psychosis, Abilify was discontinued and patient was switched to Risperdal, titrated to 2mg daily and 4mg qhs, started on Naltrexone 25mg and titrated to 50mg daily.  Medication teaching, risk/benefits discussed regarding EDMONDS Risperdal vs Invega, and Vivitrol. Patient did not give consent for injections.     Patient’s clinical progress was slow but steady. He showed improvement in his symptoms with Risperdal, with a gradual reduction of his internal preoccupation, paranoia, and thought disorganization and aggression.  Patient has responded well to medication regimen, with fair-good restoration of function.  Psychotic process and aggression have subsided. Overall he has remained in good behavioral control and has not required any additional emergent intramuscular medications or seclusion / restraints.   Patient did not attended groups.  He socialized seldomly but appropriately with peers and staff.  The patient was provided with motivational counseling to tackle stressors and enhance coping skills. Patient also given literature on “Personal safety plan” “Relapse prevention plan” Patient was provided with extensive psycho-education regarding importance of compliance with medications. Patient identified protective factors on discharge and was future oriented.   PROCEDURES AND TREATMENT:  >Individual psychotherapy/CBT modality and group therapy  >Milieu therapy and supportive therapy.  >Psychopharmacologic management.  >Motivational counseling to address substance related issues (Alcohol)  Consultations:  Medical: NA    Patient labs were all WNL. Labs include---CBC/Chemistry/LFT/A1C/Lipid. Panel/TSH/CPK/HBA1-C/U-A/U-Tox/EKG    QT/QTC: 314/453ms  Medications at discharge: Risperdal 2mg daily and 4mg qhs for psychosis, Naltrxone 50mg daily for AUD  Problem Pertinent Review of Symptoms/Associated Signs and Symptoms: Patient’s clinical improvement of symptoms led the treatment team to conclude that the patient can be safely discharged to lower level of care to outpatient services.  Psycho education was provided to the patient prior to discharge. The patient was educated about the proper and safe use of medications as well as the risks and benefits of over and under dosing. The patient was told to go to the nearest emergency department or call his PMD or psychiatrist if he experienced any adverse side effects from any of the medications.  Encouraged patient to follow up with all aftercare recommendations, and to maintain medication compliance. Discussed not stopping medications when he feels better, and processed discontinuation symptoms. Patient was informed of risk/benefit of medication, alternative treatment and no treatment.  Patient was educated about side effects of his medications, including EPS, TD.   Patient was provided with emergency phone numbers and were instructed to call 911 or go to the nearest ER for worsening of symptoms, dangerousness to self/others. Patient verbalized understanding.  Based on the above assessment, patient no longer warrants an inpatient psychiatric hospitalization.  Prognosis is guarded, given a history of inpatient psychiatric hospitalizations.  Future risk was minimized before discharge by treatment of acute mood symptoms, maximizing outpatient support, providing relevant patient education, discussing emergency procedures, and ensuring close follow-up.  Although the patient remains at his chronic baseline risk of self-harm, such risk cannot be further ameliorated by continued inpatient treatment and the patient is therefore appropriate for discharge to lower level of care.  Discharge Pan:  -Patient given 4 weeks supply of medications. Risk, benefits and alternatives discussed with patient. Patient verbalized understanding and in accord with aftercare recommendations.   -Patient instructed to call 911 or go to nearest ER in case of emergency.   -Patient given Suicide Prevention Lifeline number [phone] and provided instructions on its use  -Patient will follow up with outpatient appointments.

## 2021-12-17 PROCEDURE — 99232 SBSQ HOSP IP/OBS MODERATE 35: CPT

## 2021-12-17 RX ORDER — RISPERIDONE 4 MG/1
2 TABLET ORAL AT BEDTIME
Refills: 0 | Status: DISCONTINUED | OUTPATIENT
Start: 2021-12-17 | End: 2021-12-19

## 2021-12-17 RX ORDER — HALOPERIDOL DECANOATE 100 MG/ML
5 INJECTION INTRAMUSCULAR EVERY 6 HOURS
Refills: 0 | Status: DISCONTINUED | OUTPATIENT
Start: 2021-12-17 | End: 2022-01-05

## 2021-12-17 RX ORDER — HALOPERIDOL DECANOATE 100 MG/ML
2 INJECTION INTRAMUSCULAR ONCE
Refills: 0 | Status: DISCONTINUED | OUTPATIENT
Start: 2021-12-17 | End: 2021-12-17

## 2021-12-17 RX ORDER — DIPHENHYDRAMINE HCL 50 MG
50 CAPSULE ORAL EVERY 6 HOURS
Refills: 0 | Status: DISCONTINUED | OUTPATIENT
Start: 2021-12-17 | End: 2021-12-18

## 2021-12-17 RX ORDER — QUETIAPINE FUMARATE 200 MG/1
50 TABLET, FILM COATED ORAL AT BEDTIME
Refills: 0 | Status: DISCONTINUED | OUTPATIENT
Start: 2021-12-17 | End: 2022-01-05

## 2021-12-17 RX ORDER — DIPHENHYDRAMINE HCL 50 MG
50 CAPSULE ORAL ONCE
Refills: 0 | Status: COMPLETED | OUTPATIENT
Start: 2021-12-17 | End: 2021-12-17

## 2021-12-17 RX ORDER — CLONAZEPAM 1 MG
1 TABLET ORAL
Refills: 0 | Status: DISCONTINUED | OUTPATIENT
Start: 2021-12-17 | End: 2021-12-22

## 2021-12-17 RX ORDER — DIPHENHYDRAMINE HCL 50 MG
50 CAPSULE ORAL EVERY 6 HOURS
Refills: 0 | Status: DISCONTINUED | OUTPATIENT
Start: 2021-12-17 | End: 2022-01-05

## 2021-12-17 RX ORDER — BACITRACIN ZINC 500 UNIT/G
1 OINTMENT IN PACKET (EA) TOPICAL
Refills: 0 | Status: COMPLETED | OUTPATIENT
Start: 2021-12-17 | End: 2021-12-18

## 2021-12-17 RX ORDER — HALOPERIDOL DECANOATE 100 MG/ML
5 INJECTION INTRAMUSCULAR ONCE
Refills: 0 | Status: DISCONTINUED | OUTPATIENT
Start: 2021-12-17 | End: 2022-01-05

## 2021-12-17 RX ORDER — HALOPERIDOL DECANOATE 100 MG/ML
5 INJECTION INTRAMUSCULAR ONCE
Refills: 0 | Status: COMPLETED | OUTPATIENT
Start: 2021-12-17 | End: 2021-12-17

## 2021-12-17 RX ADMIN — RISPERIDONE 2 MILLIGRAM(S): 4 TABLET ORAL at 20:36

## 2021-12-17 RX ADMIN — AMLODIPINE BESYLATE 5 MILLIGRAM(S): 2.5 TABLET ORAL at 14:29

## 2021-12-17 RX ADMIN — Medication 2 MILLIGRAM(S): at 07:20

## 2021-12-17 RX ADMIN — HALOPERIDOL DECANOATE 5 MILLIGRAM(S): 100 INJECTION INTRAMUSCULAR at 07:20

## 2021-12-17 RX ADMIN — Medication 25 MILLIGRAM(S): at 20:36

## 2021-12-17 RX ADMIN — Medication 1 APPLICATION(S): at 20:36

## 2021-12-17 RX ADMIN — Medication 25 MILLIGRAM(S): at 14:29

## 2021-12-17 RX ADMIN — Medication 50 MILLIGRAM(S): at 07:20

## 2021-12-17 RX ADMIN — Medication 1 MILLIGRAM(S): at 20:36

## 2021-12-17 NOTE — BH INPATIENT PSYCHIATRY PROGRESS NOTE - NSBHASSESSSUMMFT_PSY_ALL_CORE
51M, Albanian-speaking, originally from [place] [place] [facility], single, employed washing dishes at a restaurant, domiciled with four roommates including his brother, per psyckes a hx of etoh use disorders, cannabis use disorder, substance induced psychotic d/o, anxiety and major depressive disorder; one prior psychiatric hospitalization at [facility] in 2020 with diagnoses of Alcohol Induced Psychotic d/o; no known prior suicidality/aggression/legal/medical hx; bib ems, activated by pt's boss when he reported he felt lightheaded and fainted at work. Psych consult called by intial ed provider who stated pt reported CAH to kill himself. Transferred to [facility] for CAH/psychosis.     PLAN:   Will continue current management--Increase Abilify 10mg PO daily for psychosis.   PRN Haldol 2mg PO/IM Q6H and Ativan 1mg PO/IM for agitation, PRN Trazodone 50mg PO QHS for insomnia. Patient encouraged to remain treatment adherent.  Continue CO as patient was unresponsive to evaluation today.  51M, Divehi-speaking, originally from [place] [facility], single, employed washing dishes at a restaurant, domiciled with four roommates including his brother, per psyckes a hx of etoh use disorders, cannabis use disorder, substance induced psychotic d/o, anxiety and major depressive disorder; one prior psychiatric hospitalization at [facility] in 2020 with diagnoses of Alcohol Induced Psychotic d/o; no known prior suicidality/aggression/legal/medical hx; bib ems, activated by pt's boss when he reported he felt lightheaded and fainted at work. Psych consult called by intial ed provider who stated pt reported CAH to kill himself. Transferred to [facility] for CAH/psychosis.     PLAN:   Will continue current management--discontinue Abilify 10m, begin Risperdal 2mg qhs for psychosis.   PRN Haldol 2mg PO/IM Q6H and Ativan 1mg PO/IM for agitation, PRN Trazodone 50mg PO QHS for insomnia. Patient encouraged to remain treatment adherent.  Continue CO as patient was unresponsive to evaluation today.

## 2021-12-17 NOTE — BH CHART NOTE - NSEVENTNOTEFT_PSY_ALL_CORE
MATY called for patient agitation and active SI. Psych emergency called. Per staff patient attempted to choke self with gown wrapped around neck, unable to be redirected, refusing PO prn medications. Haldol 5 mg IM x 1, Benadryl 50 mg IM x 1 activated for threat to self and others.   Manual hold applied at 7:10am. Patient placed in restraints at 7:26am. Patient seen after IM administered, noted to be resting comfortably in NAD, IM with fair effect.       Interval History:  After restraint placement, pt physical exam completed. Pt placed in restraints comfortably. Previous prn medication with modest effect, patient denies physical pain or complaints.     T(C): 35.9 (12-16-21 @ 22:08), Max: 36.6 (12-16-21 @ 07:56)  HR: --  BP: --  RR: --  SpO2: 98% (12-16-21 @ 22:08) (98% - 98%)    Physical Exam:  Gen: Patient placed comfortably in restraints, NAD despite aggressive yelling.   HEENT: NC/AT,  EOMI.   Resp: Breathing comfortably, nonlabored   Ext: Restraints placed appropriately on all extremities (able to easily fit 2 fingers under each restraint). No clubbing, edema, or cyanosis. 5/5 strength throughout all extremities. 2+ pulses of all extremities.   Neuro: awake, alert, grossly oriented.     Assessment:  MATY called for patient agitation, SI and violence towards self requiring IM prn medication and 4-point restraints for continued agitation, aggression and threatening behavior. Pt placed comfortably in restraints, clinically stable with exam otherwise unremarkable.     Plan:  1. Will c/w restraints for threat of harm to self. Release patient in shortest time possible.  2. Vitals q2h, will reassess clinically qh for need to continue restraints.   3. d/w RN staff who agree with plan.

## 2021-12-17 NOTE — BH INPATIENT PSYCHIATRY PROGRESS NOTE - NSBHFUPINTERVALHXFT_PSY_A_CORE
Patient is 51 year single  male (who needs  services) with PPH of MDD, anxiety,  polysubstance disorder (alcohol, and cannabis use, negative u-tox on admission), and history of substance induced psychosis. EMS was activated by patient’s boss, after pt reported feeling lightheaded and fainted while at work.  In ED pt reported CAH.  Patient is hospitalized with a primary problem of worsening psychosis.  Prior to transfer to [facility] yesterday patient received IM medications due to attempts to wrap gown around neck (intervened by [facility] staff).  Few days prior patient had been noncompliant with standing medications and was engaged in SIB by head banging.     Chart, medications and labs reviewed, with no acute findings, negative u-tox, head CT resulted unremarkable.  Patient is discussed in treatment team.  Per nursing patient had poor sleep. At about 0710am this morning, writer received a call from primary nurse reported patient had tied his hospital gown around his neck while sitting in dayroom.  Patient’s behavior continued to escalate and became aggressive.  Writer activated emergent IM medications Haldol 5mg Ativan 2mg and Benadryl 50mg.  Patient’s gown required nursing to cut it off.  Patient has small scratch on left side on his neck from incident. Manual hold applied at 7:10am. Patient placed in restraints at 7:26am.  Patient is observed while in restraints, shouting in Uruguayan, psychotic rage.  Patient’s restraints checked by writer able to place two fingers, +circulation.  Patient  quickly deescalated following IM medications.  Writer f/u with patient within 1 hour, asleep, seen after IM administered, noted to be resting comfortably in NAD, IM with fair effect, restraints removed.    Patient currently on Abilify 10mg, patient showing more prominent psychotic features today. Will cross taper Abilify and switch to Risperdal.

## 2021-12-17 NOTE — CHART NOTE - NSCHARTNOTEFT_GEN_A_CORE
Pt w/ psych emergency this morning in setting of suicide attempt. Attempted to wrap bed sheets around neck. On evaluation patient resting comfortably in bed. Mild abrasion seen on L neck w/o active bleeding, very superficial. Can apply bacitracin to lesion. Pt w/o respiratory distress, clear breath sounds, no suggestion of upper airway injury. Continue to monitor breathing .

## 2021-12-17 NOTE — BH INPATIENT PSYCHIATRY PROGRESS NOTE - PRN MEDS
MEDICATIONS  (PRN):  acetaminophen     Tablet .. 650 milliGRAM(s) Oral every 6 hours PRN Moderate Pain (4 - 6), Severe Pain (7 - 10)  haloperidol     Tablet 2 milliGRAM(s) Oral every 6 hours PRN Agitation  haloperidol    Injectable 2 milliGRAM(s) IntraMuscular once PRN Agitation  LORazepam     Tablet 1 milliGRAM(s) Oral every 6 hours PRN anxiety/agitation  LORazepam   Injectable 1 milliGRAM(s) IntraMuscular once PRN agitation  traZODone 50 milliGRAM(s) Oral at bedtime PRN Insomnia   MEDICATIONS  (PRN):  acetaminophen     Tablet .. 650 milliGRAM(s) Oral every 6 hours PRN Moderate Pain (4 - 6), Severe Pain (7 - 10)  diphenhydrAMINE 50 milliGRAM(s) Oral every 6 hours PRN agitation/anxiety  diphenhydrAMINE Injectable 50 milliGRAM(s) IntraMuscular every 6 hours PRN EPS  haloperidol     Tablet 5 milliGRAM(s) Oral every 6 hours PRN aggression  haloperidol    Injectable 5 milliGRAM(s) IntraMuscular once PRN agitation  LORazepam   Injectable 2 milliGRAM(s) IntraMuscular once PRN agitation  traZODone 50 milliGRAM(s) Oral at bedtime PRN Insomnia

## 2021-12-17 NOTE — BH INPATIENT PSYCHIATRY PROGRESS NOTE - CURRENT MEDICATION
MEDICATIONS  (STANDING):  amLODIPine   Tablet 5 milliGRAM(s) Oral daily  ARIPiprazole 10 milliGRAM(s) Oral daily  metoprolol tartrate 25 milliGRAM(s) Oral two times a day    MEDICATIONS  (PRN):  acetaminophen     Tablet .. 650 milliGRAM(s) Oral every 6 hours PRN Moderate Pain (4 - 6), Severe Pain (7 - 10)  haloperidol     Tablet 2 milliGRAM(s) Oral every 6 hours PRN Agitation  haloperidol    Injectable 2 milliGRAM(s) IntraMuscular once PRN Agitation  LORazepam     Tablet 1 milliGRAM(s) Oral every 6 hours PRN anxiety/agitation  LORazepam   Injectable 1 milliGRAM(s) IntraMuscular once PRN agitation  traZODone 50 milliGRAM(s) Oral at bedtime PRN Insomnia   MEDICATIONS  (STANDING):  amLODIPine   Tablet 5 milliGRAM(s) Oral daily  ARIPiprazole 10 milliGRAM(s) Oral daily  clonazePAM Oral Disintegrating Tablet 1 milliGRAM(s) Oral two times a day  metoprolol tartrate 25 milliGRAM(s) Oral two times a day    MEDICATIONS  (PRN):  acetaminophen     Tablet .. 650 milliGRAM(s) Oral every 6 hours PRN Moderate Pain (4 - 6), Severe Pain (7 - 10)  diphenhydrAMINE 50 milliGRAM(s) Oral every 6 hours PRN agitation/anxiety  diphenhydrAMINE Injectable 50 milliGRAM(s) IntraMuscular every 6 hours PRN EPS  haloperidol     Tablet 5 milliGRAM(s) Oral every 6 hours PRN aggression  haloperidol    Injectable 5 milliGRAM(s) IntraMuscular once PRN agitation  LORazepam   Injectable 2 milliGRAM(s) IntraMuscular once PRN agitation  traZODone 50 milliGRAM(s) Oral at bedtime PRN Insomnia

## 2021-12-17 NOTE — BH CHART NOTE - NSEVENTNOTEFT_PSY_ALL_CORE
Patient is seen with primary NP, Dr. Santoyo, and Nurse manager, and Nurse Director with use of video  Krishan #[id number].  Discussed events from this morning suicide attempt.  Patient reports he is suicidal and is hearing voices to kill self.  Discussed frequency of suicidal thoughts and AH.  This is patient's second attempt while in hospital to choke himself once on [facility] prior to transfer to [facility] and again today.  Patient perseverated on being discharged "I want to leave here I have work" Patient seem to respond well to continued observation and treatment. Patient continues to need help to keep himself safe, he states he will tell staff before he is in crisis, will continue on CO. Writer spoke with medicine team to assess patient (pt tied gown around neck) Yes

## 2021-12-17 NOTE — BH INPATIENT PSYCHIATRY PROGRESS NOTE - NSBHCHARTREVIEWVS_PSY_A_CORE FT
Vital Signs Last 24 Hrs  T(C): 35.9 (12-16-21 @ 22:08), Max: 36.6 (12-16-21 @ 07:56)  T(F): 96.7 (12-16-21 @ 22:08), Max: 97.8 (12-16-21 @ 07:56)  HR: --  BP: --  BP(mean): --  RR: --  SpO2: 98% (12-16-21 @ 22:08) (98% - 98%)    Orthostatic VS  12-16-21 @ 19:07  Lying BP: --/-- HR: --  Sitting BP: 148/99 HR: 98  Standing BP: 137/98 HR: 100  Site: --  Mode: --  Orthostatic VS  12-16-21 @ 07:56  Lying BP: --/-- HR: --  Sitting BP: 132/88 HR: 83  Standing BP: 147/108 HR: 84  Site: upper left arm  Mode: electronic  Orthostatic VS  12-15-21 @ 19:52  Lying BP: --/-- HR: --  Sitting BP: 135/75 HR: 76  Standing BP: 137/95 HR: 89  Site: --  Mode: --   Vital Signs Last 24 Hrs  T(C): 35.9 (12-16-21 @ 22:08), Max: 36.4 (12-16-21 @ 14:25)  T(F): 96.7 (12-16-21 @ 22:08), Max: 97.6 (12-16-21 @ 14:25)  HR: --  BP: --  BP(mean): --  RR: --  SpO2: 98% (12-16-21 @ 22:08) (98% - 98%)    Orthostatic VS  12-17-21 @ 09:28  Lying BP: --/-- HR: --  Sitting BP: 140/84 HR: 88  Standing BP: --/-- HR: --  Site: upper left arm  Mode: electronic  Orthostatic VS  12-16-21 @ 19:07  Lying BP: --/-- HR: --  Sitting BP: 148/99 HR: 98  Standing BP: 137/98 HR: 100  Site: --  Mode: --  Orthostatic VS  12-16-21 @ 07:56  Lying BP: --/-- HR: --  Sitting BP: 132/88 HR: 83  Standing BP: 147/108 HR: 84  Site: upper left arm  Mode: electronic  Orthostatic VS  12-15-21 @ 19:52  Lying BP: --/-- HR: --  Sitting BP: 135/75 HR: 76  Standing BP: 137/95 HR: 89  Site: --  Mode: --

## 2021-12-18 PROCEDURE — 99232 SBSQ HOSP IP/OBS MODERATE 35: CPT

## 2021-12-18 RX ORDER — DIPHENHYDRAMINE HCL 50 MG
50 CAPSULE ORAL ONCE
Refills: 0 | Status: DISCONTINUED | OUTPATIENT
Start: 2021-12-18 | End: 2022-01-05

## 2021-12-18 RX ORDER — DIPHENHYDRAMINE HCL 50 MG
50 CAPSULE ORAL ONCE
Refills: 0 | Status: DISCONTINUED | OUTPATIENT
Start: 2021-12-18 | End: 2021-12-18

## 2021-12-18 RX ADMIN — Medication 1 MILLIGRAM(S): at 22:10

## 2021-12-18 RX ADMIN — AMLODIPINE BESYLATE 5 MILLIGRAM(S): 2.5 TABLET ORAL at 09:28

## 2021-12-18 RX ADMIN — Medication 25 MILLIGRAM(S): at 22:10

## 2021-12-18 RX ADMIN — RISPERIDONE 2 MILLIGRAM(S): 4 TABLET ORAL at 22:10

## 2021-12-18 RX ADMIN — Medication 50 MILLIGRAM(S): at 22:10

## 2021-12-18 RX ADMIN — HALOPERIDOL DECANOATE 5 MILLIGRAM(S): 100 INJECTION INTRAMUSCULAR at 09:27

## 2021-12-18 RX ADMIN — Medication 1 APPLICATION(S): at 09:53

## 2021-12-18 RX ADMIN — Medication 25 MILLIGRAM(S): at 09:27

## 2021-12-18 RX ADMIN — Medication 1 MILLIGRAM(S): at 09:27

## 2021-12-18 NOTE — BH INPATIENT PSYCHIATRY PROGRESS NOTE - NSBHFUPINTERVALHXFT_PSY_A_CORE
Patient is followed up for psychosis and depression.  Use of  named Shannon # [id number].  Chart, medications and labs reviewed.  Patient is discussed with nursing staff. Per nursing report patient remains compliant with all standing medications and tolerating well. Patient remains tenuous behavioral control, patient received IM medications yesterday for psychotic aggression, PMA, also psych emergency called yesterday after pt made suicide attempt to wrap hospital gown around neck. Patient is now in paper scrubs, remains on CO, with unit restrictions due to high risk for suicide.  No significant overnight issues. Patient describes mood as “I’m fine I want to go home.” Patient states he is upset that he is here, wants to call family, requested his cell phone to retrieve phone numbers. He is very disorganized, psychotic. Responses to questions are illogical, nonsensical at times.  +CAH to hurt self but states “Im not listening to voices”  Reports some improvement in sleep, fair appetite. Denies SI/SIB/HI/AVH at this time.

## 2021-12-18 NOTE — BH INPATIENT PSYCHIATRY PROGRESS NOTE - CURRENT MEDICATION
MEDICATIONS  (STANDING):  amLODIPine   Tablet 5 milliGRAM(s) Oral daily  clonazePAM Oral Disintegrating Tablet 1 milliGRAM(s) Oral two times a day  metoprolol tartrate 25 milliGRAM(s) Oral two times a day  risperiDONE   Tablet 2 milliGRAM(s) Oral at bedtime    MEDICATIONS  (PRN):  acetaminophen     Tablet .. 650 milliGRAM(s) Oral every 6 hours PRN Moderate Pain (4 - 6), Severe Pain (7 - 10)  diphenhydrAMINE 50 milliGRAM(s) Oral every 6 hours PRN agitation/anxiety  diphenhydrAMINE Injectable 50 milliGRAM(s) IntraMuscular once PRN agitation  haloperidol     Tablet 5 milliGRAM(s) Oral every 6 hours PRN aggression  haloperidol    Injectable 5 milliGRAM(s) IntraMuscular once PRN agitation  LORazepam   Injectable 2 milliGRAM(s) IntraMuscular once PRN agitation  QUEtiapine 50 milliGRAM(s) Oral at bedtime PRN insomnia  traZODone 50 milliGRAM(s) Oral at bedtime PRN Insomnia

## 2021-12-18 NOTE — BH INPATIENT PSYCHIATRY PROGRESS NOTE - NSBHCHARTREVIEWVS_PSY_A_CORE FT
Vital Signs Last 24 Hrs  T(C): 35.4 (12-18-21 @ 09:00), Max: 35.4 (12-18-21 @ 09:00)  T(F): 95.8 (12-18-21 @ 09:00), Max: 95.8 (12-18-21 @ 09:00)  HR: --  BP: --  BP(mean): --  RR: 18 (12-17-21 @ 21:00) (18 - 18)  SpO2: 98% (12-17-21 @ 21:00) (98% - 98%)    Orthostatic VS  12-18-21 @ 09:00  Lying BP: --/-- HR: --  Sitting BP: 115/69 HR: 61  Standing BP: 128/94 HR: 73  Site: --  Mode: --  Orthostatic VS  12-17-21 @ 14:18  Lying BP: --/-- HR: --  Sitting BP: 145/90 HR: 72  Standing BP: --/-- HR: --  Site: --  Mode: --  Orthostatic VS  12-17-21 @ 09:28  Lying BP: --/-- HR: --  Sitting BP: 140/84 HR: 88  Standing BP: --/-- HR: --  Site: upper left arm  Mode: electronic  Orthostatic VS  12-16-21 @ 19:07  Lying BP: --/-- HR: --  Sitting BP: 148/99 HR: 98  Standing BP: 137/98 HR: 100  Site: --  Mode: --

## 2021-12-18 NOTE — BH INPATIENT PSYCHIATRY PROGRESS NOTE - NSBHASSESSSUMMFT_PSY_ALL_CORE
51M, French-speaking, originally from [facility], single, employed washing dishes at a restaurant, domiciled with four roommates including his brother, per psyckes a hx of etoh use disorders, cannabis use disorder, substance induced psychotic d/o, anxiety and major depressive disorder; one prior psychiatric hospitalization at [facility] in 2020 with diagnoses of Alcohol Induced Psychotic d/o; no known prior suicidality/aggression/legal/medical hx; bib ems, activated by pt's boss when he reported he felt lightheaded and fainted at work. Psych consult called by intial ed provider who stated pt reported CAH to kill himself. Transferred to [facility] for CAH/psychosis.     PLAN:   Will continue current management--discontinue Abilify 10m, begin Risperdal 2mg qhs for psychosis.   PRN Haldol 2mg PO/IM Q6H and Ativan 1mg PO/IM for agitation, PRN Trazodone 50mg PO QHS for insomnia. Patient encouraged to remain treatment adherent.  Continue CO as patient was unresponsive to evaluation today.

## 2021-12-18 NOTE — BH INPATIENT PSYCHIATRY PROGRESS NOTE - PRN MEDS
MEDICATIONS  (PRN):  acetaminophen     Tablet .. 650 milliGRAM(s) Oral every 6 hours PRN Moderate Pain (4 - 6), Severe Pain (7 - 10)  diphenhydrAMINE 50 milliGRAM(s) Oral every 6 hours PRN agitation/anxiety  diphenhydrAMINE Injectable 50 milliGRAM(s) IntraMuscular once PRN agitation  haloperidol     Tablet 5 milliGRAM(s) Oral every 6 hours PRN aggression  haloperidol    Injectable 5 milliGRAM(s) IntraMuscular once PRN agitation  LORazepam   Injectable 2 milliGRAM(s) IntraMuscular once PRN agitation  QUEtiapine 50 milliGRAM(s) Oral at bedtime PRN insomnia  traZODone 50 milliGRAM(s) Oral at bedtime PRN Insomnia

## 2021-12-19 PROCEDURE — 99232 SBSQ HOSP IP/OBS MODERATE 35: CPT

## 2021-12-19 RX ORDER — RISPERIDONE 4 MG/1
3 TABLET ORAL AT BEDTIME
Refills: 0 | Status: DISCONTINUED | OUTPATIENT
Start: 2021-12-19 | End: 2021-12-22

## 2021-12-19 RX ADMIN — Medication 1 MILLIGRAM(S): at 09:43

## 2021-12-19 RX ADMIN — Medication 50 MILLIGRAM(S): at 21:37

## 2021-12-19 RX ADMIN — Medication 25 MILLIGRAM(S): at 09:43

## 2021-12-19 RX ADMIN — AMLODIPINE BESYLATE 5 MILLIGRAM(S): 2.5 TABLET ORAL at 09:43

## 2021-12-19 RX ADMIN — RISPERIDONE 3 MILLIGRAM(S): 4 TABLET ORAL at 21:38

## 2021-12-19 RX ADMIN — Medication 25 MILLIGRAM(S): at 21:38

## 2021-12-19 RX ADMIN — Medication 1 MILLIGRAM(S): at 21:37

## 2021-12-19 NOTE — BH INPATIENT PSYCHIATRY PROGRESS NOTE - NSBHFUPINTERVALHXFT_PSY_A_CORE
Patient is followed up for psychosis and depression.  Use of  named Miguel #[id number].  Patient is followed up for psychosis and depression.  Use of  named   Chart, medications and labs reviewed.  Patient is discussed with nursing staff. Per nursing report patient remains compliant with all standing medications and tolerating well. Patient will remain in paper scrubs, remains on CO, with unit restrictions due to high risk for suicide.  No significant overnight issues. Patient describes mood as “I feel better, I need to go so I can go to work.” Patient states he is upset that he is here, wants to go to work “I need to help my family.” Patient reports he was able to call brother yesterday  but no one picked up phone.   He remains disorganized, psychotic, unpredictable. Responses to questions are illogical, nonsensical at times.  Denies CAH to hurt self.   Reports some improvement in sleep, fair appetite. Denies SI/SIB/HI/AVH at this time. Renew CO

## 2021-12-19 NOTE — BH INPATIENT PSYCHIATRY PROGRESS NOTE - NSBHASSESSSUMMFT_PSY_ALL_CORE
51M, Chinese-speaking, originally from [facility], single, employed washing dishes at a restaurant, domiciled with four roommates including his brother, per psyckes a hx of etoh use disorders, cannabis use disorder, substance induced psychotic d/o, anxiety and major depressive disorder; one prior psychiatric hospitalization at [facility] in 2020 with diagnoses of Alcohol Induced Psychotic d/o; no known prior suicidality/aggression/legal/medical hx; bib ems, activated by pt's boss when he reported he felt lightheaded and fainted at work. Psych consult called by intial ed provider who stated pt reported CAH to kill himself. Transferred to [facility] for CAH/psychosis.     PLAN:   Will continue current management--increase Risperdal 3mg qhs for psychosis.   PRN Haldol 2mg PO/IM Q6H and Ativan 1mg PO/IM for agitation, PRN Trazodone 50mg PO QHS for insomnia. Patient encouraged to remain treatment adherent.  Continue CO as patient was unresponsive to evaluation today.

## 2021-12-19 NOTE — BH INPATIENT PSYCHIATRY PROGRESS NOTE - NSBHCHARTREVIEWVS_PSY_A_CORE FT
Vital Signs Last 24 Hrs  T(C): 36.2 (12-19-21 @ 06:17), Max: 36.9 (12-18-21 @ 21:27)  T(F): 97.1 (12-19-21 @ 06:17), Max: 98.4 (12-18-21 @ 21:27)  HR: --  BP: --  BP(mean): --  RR: --  SpO2: --    Orthostatic VS  12-19-21 @ 08:16  Lying BP: --/-- HR: --  Sitting BP: 110/74 HR: 68  Standing BP: --/-- HR: --  Site: --  Mode: --  Orthostatic VS  12-18-21 @ 19:19  Lying BP: --/-- HR: --  Sitting BP: 118/78 HR: 83  Standing BP: 120/82 HR: 86  Site: --  Mode: --  Orthostatic VS  12-18-21 @ 09:00  Lying BP: --/-- HR: --  Sitting BP: 115/69 HR: 61  Standing BP: 128/94 HR: 73  Site: --  Mode: --  Orthostatic VS  12-17-21 @ 14:18  Lying BP: --/-- HR: --  Sitting BP: 145/90 HR: 72  Standing BP: --/-- HR: --  Site: --  Mode: --

## 2021-12-20 PROCEDURE — 99232 SBSQ HOSP IP/OBS MODERATE 35: CPT

## 2021-12-20 RX ADMIN — Medication 50 MILLIGRAM(S): at 20:11

## 2021-12-20 RX ADMIN — Medication 25 MILLIGRAM(S): at 09:15

## 2021-12-20 RX ADMIN — QUETIAPINE FUMARATE 50 MILLIGRAM(S): 200 TABLET, FILM COATED ORAL at 20:11

## 2021-12-20 RX ADMIN — AMLODIPINE BESYLATE 5 MILLIGRAM(S): 2.5 TABLET ORAL at 09:15

## 2021-12-20 RX ADMIN — Medication 25 MILLIGRAM(S): at 20:11

## 2021-12-20 RX ADMIN — Medication 1 MILLIGRAM(S): at 20:11

## 2021-12-20 RX ADMIN — Medication 1 MILLIGRAM(S): at 09:15

## 2021-12-20 RX ADMIN — RISPERIDONE 3 MILLIGRAM(S): 4 TABLET ORAL at 20:11

## 2021-12-20 NOTE — BH INPATIENT PSYCHIATRY PROGRESS NOTE - NSBHFUPINTERVALHXFT_PSY_A_CORE
Patient is followed up for psychosis and depression.  Use of  #[id number].  Chart, medications and labs reviewed.  Patient is discussed with nursing staff.  Per nursing report patient remains compliant with all standing medications and tolerating well. Patient will remain in paper scrubs, remains on CO, with unit restrictions due to high risk for suicide.  No significant overnight issues.  The patient continues to be discharge focused.  He remains rather disorganized, not answering questions much of the time, needing to repeat questions multiple times to receive appropriate answer.  The patient states he needs to be discharged to work.  He was unable to give a clear answer as to who he lives with.  He states his mood is better, and denies any current SI.  However, he also states he doesn't feel safe without 1:1 present.  The patient denies any current AHs.  He has been visible on the unit, behavior well controlled over the weekend.  He states he is eating and sleeping well, and expressed being grateful for the care he is receiving.

## 2021-12-20 NOTE — BH INPATIENT PSYCHIATRY PROGRESS NOTE - NSBHASSESSSUMMFT_PSY_ALL_CORE
51M, Lithuanian-speaking, originally from [facility], single, employed washing dishes at a restaurant, domiciled with four roommates including his brother, per psyckes a hx of etoh use disorders, cannabis use disorder, substance induced psychotic d/o, anxiety and major depressive disorder; one prior psychiatric hospitalization at [facility] in 2020 with diagnoses of Alcohol Induced Psychotic d/o; no known prior suicidality/aggression/legal/medical hx; bib ems, activated by pt's boss when he reported he felt lightheaded and fainted at work. Psych consult called by initial ED provider who stated pt reported CAH to kill himself. Transferred to [facility] for CAH/psychosis.     The patient continues to be disorganized, unpredictable.  His behavior has been better controlled over the weekend, and he is minimizing symptoms, but he reports still feeling unsafe.    PLAN:   Will continue current management--continue Risperdal 3mg qhs for psychosis.   PRN Haldol 2mg PO/IM Q6H and Ativan 1mg PO/IM for agitation, PRN Trazodone 50mg PO QHS for insomnia. Patient encouraged to remain treatment adherent.  Continue CO as patient reports not feeling safe, remains disorganized.

## 2021-12-20 NOTE — BH INPATIENT PSYCHIATRY PROGRESS NOTE - NSBHCHARTREVIEWVS_PSY_A_CORE FT
Vital Signs Last 24 Hrs  T(C): 35.9 (12-20-21 @ 06:22), Max: 36.6 (12-19-21 @ 22:51)  T(F): 96.7 (12-20-21 @ 06:22), Max: 97.9 (12-19-21 @ 22:51)  HR: --  BP: --  BP(mean): --  RR: --  SpO2: --    Orthostatic VS  12-19-21 @ 21:35  Lying BP: --/-- HR: --  Sitting BP: 136/86 HR: 88  Standing BP: 138/88 HR: 93  Site: --  Mode: --  Orthostatic VS  12-19-21 @ 08:16  Lying BP: --/-- HR: --  Sitting BP: 110/74 HR: 68  Standing BP: --/-- HR: --  Site: --  Mode: --  Orthostatic VS  12-18-21 @ 19:19  Lying BP: --/-- HR: --  Sitting BP: 118/78 HR: 83  Standing BP: 120/82 HR: 86  Site: --  Mode: --

## 2021-12-20 NOTE — BH INPATIENT PSYCHIATRY PROGRESS NOTE - CURRENT MEDICATION
MEDICATIONS  (STANDING):  amLODIPine   Tablet 5 milliGRAM(s) Oral daily  clonazePAM Oral Disintegrating Tablet 1 milliGRAM(s) Oral two times a day  metoprolol tartrate 25 milliGRAM(s) Oral two times a day  risperiDONE   Tablet 3 milliGRAM(s) Oral at bedtime    MEDICATIONS  (PRN):  acetaminophen     Tablet .. 650 milliGRAM(s) Oral every 6 hours PRN Moderate Pain (4 - 6), Severe Pain (7 - 10)  diphenhydrAMINE 50 milliGRAM(s) Oral every 6 hours PRN agitation/anxiety  diphenhydrAMINE Injectable 50 milliGRAM(s) IntraMuscular once PRN agitation  haloperidol     Tablet 5 milliGRAM(s) Oral every 6 hours PRN aggression  haloperidol    Injectable 5 milliGRAM(s) IntraMuscular once PRN agitation  LORazepam   Injectable 2 milliGRAM(s) IntraMuscular once PRN agitation  QUEtiapine 50 milliGRAM(s) Oral at bedtime PRN insomnia  traZODone 50 milliGRAM(s) Oral at bedtime PRN Insomnia

## 2021-12-21 LAB — SARS-COV-2 RNA SPEC QL NAA+PROBE: SIGNIFICANT CHANGE UP

## 2021-12-21 PROCEDURE — 99232 SBSQ HOSP IP/OBS MODERATE 35: CPT

## 2021-12-21 RX ADMIN — Medication 1 MILLIGRAM(S): at 20:13

## 2021-12-21 RX ADMIN — Medication 1 MILLIGRAM(S): at 08:56

## 2021-12-21 RX ADMIN — Medication 50 MILLIGRAM(S): at 20:14

## 2021-12-21 RX ADMIN — Medication 25 MILLIGRAM(S): at 20:13

## 2021-12-21 RX ADMIN — QUETIAPINE FUMARATE 50 MILLIGRAM(S): 200 TABLET, FILM COATED ORAL at 20:14

## 2021-12-21 RX ADMIN — Medication 25 MILLIGRAM(S): at 08:56

## 2021-12-21 RX ADMIN — RISPERIDONE 3 MILLIGRAM(S): 4 TABLET ORAL at 20:13

## 2021-12-21 RX ADMIN — AMLODIPINE BESYLATE 5 MILLIGRAM(S): 2.5 TABLET ORAL at 08:56

## 2021-12-21 NOTE — BH INPATIENT PSYCHIATRY PROGRESS NOTE - NSBHFUPINTERVALHXFT_PSY_A_CORE
Patient is followed up for psychosis and depression.  Use of  #[id number].  Chart, medications and labs reviewed.  Patient is discussed with nursing staff.  Per nursing report patient remains compliant with all standing medications and tolerating well. Patient remains in paper scrubs, remains on CO.  No significant overnight issues.  The patient continues to be discharge focused.  He remains somewhat disorganized, but better able to answer questions today - still needing repetition sometimes.  He states his mood is better, and denies any current SI.  Behavior has been well controlled.  However, he continues to state he wouldn't be safe without 1:1 present.  The patient denies any current AHs.  He has been visible on the unit.  He is eating and sleeping well.

## 2021-12-21 NOTE — BH INPATIENT PSYCHIATRY PROGRESS NOTE - NSBHMETABOLIC_PSY_ALL_CORE_FT
BMI:   HbA1c:   Glucose: POCT Blood Glucose.: 83 mg/dL (12-11-21 @ 16:35)    BP: 133/88 (12-21-21 @ 08:01) (133/88 - 133/88)  Lipid Panel:

## 2021-12-21 NOTE — BH INPATIENT PSYCHIATRY PROGRESS NOTE - NSBHASSESSSUMMFT_PSY_ALL_CORE
51M, Kyrgyz-speaking, originally from [facility], single, employed washing dishes at a restaurant, domiciled with four roommates including his brother, per psyckes a hx of etoh use disorders, cannabis use disorder, substance induced psychotic d/o, anxiety and major depressive disorder; one prior psychiatric hospitalization at [facility] in 2020 with diagnoses of Alcohol Induced Psychotic d/o; no known prior suicidality/aggression/legal/medical hx; bib ems, activated by pt's boss when he reported he felt lightheaded and fainted at work. Psych consult called by initial ED provider who stated pt reported CAH to kill himself. Transferred to [facility] for CAH/psychosis.     The patient continues to be disorganized, unpredictable.  His behavior has been better controlled, and he is minimizing symptoms, but he reports still feeling unsafe.  Continue 1:1, but will allow for utensils, and blanket.    PLAN:   Will continue current management--continue Risperdal 3mg qhs for psychosis.   PRN Haldol 2mg PO/IM Q6H and Ativan 1mg PO/IM for agitation, PRN Trazodone 50mg PO QHS for insomnia. Patient encouraged to remain treatment adherent.  Continue CO as patient reports not feeling safe, remains disorganized.

## 2021-12-21 NOTE — BH INPATIENT PSYCHIATRY PROGRESS NOTE - NSBHCHARTREVIEWVS_PSY_A_CORE FT
Vital Signs Last 24 Hrs  T(C): 36.6 (12-21-21 @ 08:01), Max: 36.6 (12-20-21 @ 19:34)  T(F): 97.9 (12-21-21 @ 08:01), Max: 97.9 (12-21-21 @ 08:01)  HR: 64 (12-21-21 @ 08:01) (64 - 64)  BP: 133/88 (12-21-21 @ 08:01) (133/88 - 133/88)  BP(mean): --  RR: --  SpO2: --    Orthostatic VS  12-20-21 @ 19:34  Lying BP: --/-- HR: --  Sitting BP: 125/82 HR: 80  Standing BP: 134/90 HR: 87  Site: --  Mode: --  Orthostatic VS  12-20-21 @ 09:34  Lying BP: --/-- HR: --  Sitting BP: 150/97 HR: 70  Standing BP: 118/85 HR: 76  Site: --  Mode: --  Orthostatic VS  12-19-21 @ 21:35  Lying BP: --/-- HR: --  Sitting BP: 136/86 HR: 88  Standing BP: 138/88 HR: 93  Site: --  Mode: --

## 2021-12-22 PROCEDURE — 99232 SBSQ HOSP IP/OBS MODERATE 35: CPT

## 2021-12-22 RX ORDER — CLONAZEPAM 1 MG
0.5 TABLET ORAL
Refills: 0 | Status: DISCONTINUED | OUTPATIENT
Start: 2021-12-22 | End: 2021-12-28

## 2021-12-22 RX ORDER — RISPERIDONE 4 MG/1
4 TABLET ORAL AT BEDTIME
Refills: 0 | Status: DISCONTINUED | OUTPATIENT
Start: 2021-12-22 | End: 2022-01-05

## 2021-12-22 RX ADMIN — Medication 25 MILLIGRAM(S): at 09:11

## 2021-12-22 RX ADMIN — Medication 25 MILLIGRAM(S): at 20:01

## 2021-12-22 RX ADMIN — Medication 0.5 MILLIGRAM(S): at 20:01

## 2021-12-22 RX ADMIN — Medication 0.5 MILLIGRAM(S): at 09:11

## 2021-12-22 RX ADMIN — Medication 50 MILLIGRAM(S): at 20:01

## 2021-12-22 RX ADMIN — QUETIAPINE FUMARATE 50 MILLIGRAM(S): 200 TABLET, FILM COATED ORAL at 20:01

## 2021-12-22 RX ADMIN — RISPERIDONE 4 MILLIGRAM(S): 4 TABLET ORAL at 20:01

## 2021-12-22 RX ADMIN — AMLODIPINE BESYLATE 5 MILLIGRAM(S): 2.5 TABLET ORAL at 09:11

## 2021-12-22 NOTE — BH INPATIENT PSYCHIATRY PROGRESS NOTE - NSBHCHARTREVIEWVS_PSY_A_CORE FT
Vital Signs Last 24 Hrs  T(C): 36.5 (12-22-21 @ 05:43), Max: 37.2 (12-21-21 @ 22:35)  T(F): 97.7 (12-22-21 @ 05:43), Max: 98.9 (12-21-21 @ 22:35)  HR: 64 (12-21-21 @ 08:01) (64 - 64)  BP: 133/88 (12-21-21 @ 08:01) (133/88 - 133/88)  BP(mean): --  RR: 18 (12-21-21 @ 22:05) (18 - 18)  SpO2: 98% (12-21-21 @ 22:05) (98% - 98%)    Orthostatic VS  12-21-21 @ 19:29  Lying BP: --/-- HR: --  Sitting BP: 128/73 HR: 70  Standing BP: 120/84 HR: 76  Site: --  Mode: --  Orthostatic VS  12-20-21 @ 19:34  Lying BP: --/-- HR: --  Sitting BP: 125/82 HR: 80  Standing BP: 134/90 HR: 87  Site: --  Mode: --  Orthostatic VS  12-20-21 @ 09:34  Lying BP: --/-- HR: --  Sitting BP: 150/97 HR: 70  Standing BP: 118/85 HR: 76  Site: --  Mode: --   Vital Signs Last 24 Hrs  T(C): 36.5 (12-22-21 @ 05:43), Max: 37.2 (12-21-21 @ 22:35)  T(F): 97.7 (12-22-21 @ 05:43), Max: 98.9 (12-21-21 @ 22:35)  HR: --  BP: --  BP(mean): --  RR: 18 (12-21-21 @ 22:05) (18 - 18)  SpO2: 98% (12-21-21 @ 22:05) (98% - 98%)    Orthostatic VS  12-22-21 @ 08:16  Lying BP: --/-- HR: --  Sitting BP: 117/79 HR: 74  Standing BP: 112/72 HR: 81  Site: upper left arm  Mode: electronic  Orthostatic VS  12-21-21 @ 19:29  Lying BP: --/-- HR: --  Sitting BP: 128/73 HR: 70  Standing BP: 120/84 HR: 76  Site: --  Mode: --  Orthostatic VS  12-20-21 @ 19:34  Lying BP: --/-- HR: --  Sitting BP: 125/82 HR: 80  Standing BP: 134/90 HR: 87  Site: --  Mode: --

## 2021-12-22 NOTE — BH INPATIENT PSYCHIATRY PROGRESS NOTE - CURRENT MEDICATION
MEDICATIONS  (STANDING):  amLODIPine   Tablet 5 milliGRAM(s) Oral daily  clonazePAM Oral Disintegrating Tablet 0.5 milliGRAM(s) Oral two times a day  metoprolol tartrate 25 milliGRAM(s) Oral two times a day  risperiDONE   Tablet 3 milliGRAM(s) Oral at bedtime    MEDICATIONS  (PRN):  acetaminophen     Tablet .. 650 milliGRAM(s) Oral every 6 hours PRN Moderate Pain (4 - 6), Severe Pain (7 - 10)  diphenhydrAMINE 50 milliGRAM(s) Oral every 6 hours PRN agitation/anxiety  diphenhydrAMINE Injectable 50 milliGRAM(s) IntraMuscular once PRN agitation  haloperidol     Tablet 5 milliGRAM(s) Oral every 6 hours PRN aggression  haloperidol    Injectable 5 milliGRAM(s) IntraMuscular once PRN agitation  LORazepam   Injectable 2 milliGRAM(s) IntraMuscular once PRN agitation  QUEtiapine 50 milliGRAM(s) Oral at bedtime PRN insomnia  traZODone 50 milliGRAM(s) Oral at bedtime PRN Insomnia

## 2021-12-22 NOTE — BH INPATIENT PSYCHIATRY PROGRESS NOTE - NSBHFUPINTERVALHXFT_PSY_A_CORE
Patient is followed up for psychosis and depression.  Use of  #[id number]  Patient is 51 year single  male (who needs  services) with PPH of MDD, anxiety,  polysubstance disorder (alcohol, and cannabis use, negative u-tox on admission), and history of substance induced psychosis.   Chart, medications and labs reviewed, with no acute findings.  Patient is discussed in treatment team.  Per nursing patient remains compliant with all standing medications and tolerating well “I’m taking all my medications” . Patient remains in paper scrubs, remains on CO for SI.  No significant overnight issues. He remains somewhat disorganized, but better able to answer questions today.  He states his mood is “I’m doing better”  He denies SI/SIB, reports “I have not felt like hurting myself” however, he continues to state he wouldn't be safe without 1:1 present, unable to engage in safety planning  “I feel like I still need support”.  The patient denies any current AVH, mina or paranoia.  Per nursing he has been more visible on the unit.  He is eating and sleeping well.  He remains discharge focus stating he needs to go back to work, lacks insight in need for treatment.

## 2021-12-22 NOTE — BH INPATIENT PSYCHIATRY PROGRESS NOTE - NSBHASSESSSUMMFT_PSY_ALL_CORE
51M, Amharic-speaking, originally from [place], single, employed washing dishes at a restaurant, domiciled with four roommates including his brother, per psyckes a hx of etoh use disorders, cannabis use disorder, substance induced psychotic d/o, anxiety and major depressive disorder; one prior psychiatric hospitalization at [facility] in 2020 with diagnoses of Alcohol Induced Psychotic d/o; no known prior suicidality/aggression/legal/medical hx; bib ems, activated by pt's boss when he reported he felt lightheaded and fainted at work. Psych consult called by initial ED provider who stated pt reported CAH to kill himself. Transferred to [facility] for CAH/psychosis.     The patient continues to be disorganized, unpredictable.  His behavior has been better controlled, and he is minimizing symptoms, but he reports still feeling unsafe.  Continue 1:1, but will allow for utensils, and blanket.    PLAN:   Will continue current management--continue Risperdal 3mg qhs for psychosis.   PRN Haldol 2mg PO/IM Q6H and Ativan 1mg PO/IM for agitation, PRN Trazodone 50mg PO QHS for insomnia. Patient encouraged to remain treatment adherent.  Continue CO as patient reports not feeling safe, remains disorganized. 51M, Setswana-speaking, originally from [facility], single, employed washing dishes at a restaurant, domiciled with four roommates including his brother, per psyckes a hx of etoh use disorders, cannabis use disorder, substance induced psychotic d/o, anxiety and major depressive disorder; one prior psychiatric hospitalization at [facility] in 2020 with diagnoses of Alcohol Induced Psychotic d/o; no known prior suicidality/aggression/legal/medical hx; bib ems, activated by pt's boss when he reported he felt lightheaded and fainted at work. Psych consult called by initial ED provider who stated pt reported CAH to kill himself. Transferred to [facility] for CAH/psychosis.     The patient continues to be disorganized, unpredictable.  His behavior has been better controlled, and he is minimizing symptoms, but he reports still feeling unsafe.  Continue 1:1, but will allow for utensils, and blanket.    PLAN:   Will continue current management--increase Risperdal 4mg qhs for psychosis.   PRN Haldol 2mg PO/IM Q6H and Ativan 1mg PO/IM for agitation, PRN Trazodone 50mg PO QHS for insomnia. Patient encouraged to remain treatment adherent.  Continue CO as patient reports not feeling safe, remains disorganized.

## 2021-12-23 PROCEDURE — 99232 SBSQ HOSP IP/OBS MODERATE 35: CPT

## 2021-12-23 RX ORDER — PALIPERIDONE 1.5 MG/1
234 TABLET, EXTENDED RELEASE ORAL
Qty: 1 | Refills: 0
Start: 2021-12-23 | End: 2021-12-23

## 2021-12-23 RX ORDER — PALIPERIDONE 1.5 MG/1
156 TABLET, EXTENDED RELEASE ORAL
Qty: 1 | Refills: 0
Start: 2021-12-23 | End: 2021-12-23

## 2021-12-23 RX ADMIN — Medication 25 MILLIGRAM(S): at 08:32

## 2021-12-23 RX ADMIN — Medication 0.5 MILLIGRAM(S): at 20:18

## 2021-12-23 RX ADMIN — Medication 0.5 MILLIGRAM(S): at 08:32

## 2021-12-23 RX ADMIN — Medication 25 MILLIGRAM(S): at 20:18

## 2021-12-23 RX ADMIN — AMLODIPINE BESYLATE 5 MILLIGRAM(S): 2.5 TABLET ORAL at 08:32

## 2021-12-23 RX ADMIN — RISPERIDONE 4 MILLIGRAM(S): 4 TABLET ORAL at 20:19

## 2021-12-23 NOTE — BH INPATIENT PSYCHIATRY PROGRESS NOTE - CURRENT MEDICATION
MEDICATIONS  (STANDING):  amLODIPine   Tablet 5 milliGRAM(s) Oral daily  clonazePAM Oral Disintegrating Tablet 0.5 milliGRAM(s) Oral two times a day  metoprolol tartrate 25 milliGRAM(s) Oral two times a day  risperiDONE   Tablet 4 milliGRAM(s) Oral at bedtime    MEDICATIONS  (PRN):  acetaminophen     Tablet .. 650 milliGRAM(s) Oral every 6 hours PRN Moderate Pain (4 - 6), Severe Pain (7 - 10)  diphenhydrAMINE 50 milliGRAM(s) Oral every 6 hours PRN agitation/anxiety  diphenhydrAMINE Injectable 50 milliGRAM(s) IntraMuscular once PRN agitation  haloperidol     Tablet 5 milliGRAM(s) Oral every 6 hours PRN aggression  haloperidol    Injectable 5 milliGRAM(s) IntraMuscular once PRN agitation  LORazepam   Injectable 2 milliGRAM(s) IntraMuscular once PRN agitation  QUEtiapine 50 milliGRAM(s) Oral at bedtime PRN insomnia  traZODone 50 milliGRAM(s) Oral at bedtime PRN Insomnia

## 2021-12-23 NOTE — BH INPATIENT PSYCHIATRY PROGRESS NOTE - NSBHASSESSSUMMFT_PSY_ALL_CORE
51M, Hungarian-speaking, originally from [facility], single, employed washing dishes at a restaurant, domiciled with four roommates including his brother, per psyckes a hx of etoh use disorders, cannabis use disorder, substance induced psychotic d/o, anxiety and major depressive disorder; one prior psychiatric hospitalization at [facility] in 2020 with diagnoses of Alcohol Induced Psychotic d/o; no known prior suicidality/aggression/legal/medical hx; bib ems, activated by pt's boss when he reported he felt lightheaded and fainted at work. Psych consult called by initial ED provider who stated pt reported CAH to kill himself. Transferred to [facility] for CAH/psychosis.     The patient continues to be disorganized, unpredictable.  His behavior has been better controlled, and he is minimizing symptoms, but he reports still feeling unsafe.  Continue 1:1, but will allow for utensils, and blanket.    PLAN:   Will continue current management--increase Risperdal 4mg qhs for psychosis.   PRN Haldol 2mg PO/IM Q6H and Ativan 1mg PO/IM for agitation, PRN Trazodone 50mg PO QHS for insomnia. Patient encouraged to remain treatment adherent.  Continue CO as patient reports not feeling safe, remains disorganized.

## 2021-12-23 NOTE — BH INPATIENT PSYCHIATRY PROGRESS NOTE - NSBHFUPINTERVALHXFT_PSY_A_CORE
Patient is followed up for psychosis and depression.  Use of  #[id number] Rachelle  Patient is 51 year single  male (who needs  services) with PPH of MDD, anxiety polysubstance disorder (alcohol, and cannabis use, negative u-tox on admission), and history of substance induced psychosis.   Chart, medications and labs reviewed, with no acute findings.  Patient is discussed with nursing team.  Per nursing patient remains compliant with all standing medications and tolerating well.  Patient remains in paper scrubs, remains on CO for SI.  No significant overnight issues. He remains somewhat disorganized, but better able to answer questions. Per  at times responses to questions are irrelevant. BPRS 32.  Reports  mood as “I’m doing better”  however when asked specifically how he is doing better pt reports “I need to be discharged to go to work.”  He denies SI/SIB, reports “I have not felt like hurting myself” however, he continues to state he wouldn't be safe without 1:1 present, unable to engage in safety planning  “I feel like I still need support”.  The patient denies any current AVH, mina or paranoia.  He denies any pain, reports daily BM.   He is eating and sleeping well.  He remains discharge focus stating he needs to go back to work, lacks insight in need for treatment. Patient states he was finally able to get in contact with his brother, pt gave consent to call brother Gabo at [phone].

## 2021-12-23 NOTE — BH INPATIENT PSYCHIATRY PROGRESS NOTE - NSBHCHARTREVIEWVS_PSY_A_CORE FT
Vital Signs Last 24 Hrs  T(C): 36.2 (12-23-21 @ 06:19), Max: 36.6 (12-22-21 @ 14:33)  T(F): 97.2 (12-23-21 @ 06:19), Max: 97.8 (12-22-21 @ 14:33)  HR: --  BP: --  BP(mean): --  RR: --  SpO2: --    Orthostatic VS  12-22-21 @ 19:18  Lying BP: --/-- HR: --  Sitting BP: 136/87 HR: 83  Standing BP: 117/81 HR: 87  Site: --  Mode: --  Orthostatic VS  12-22-21 @ 08:16  Lying BP: --/-- HR: --  Sitting BP: 117/79 HR: 74  Standing BP: 112/72 HR: 81  Site: upper left arm  Mode: electronic  Orthostatic VS  12-21-21 @ 19:29  Lying BP: --/-- HR: --  Sitting BP: 128/73 HR: 70  Standing BP: 120/84 HR: 76  Site: --  Mode: --   Vital Signs Last 24 Hrs  T(C): 36.2 (12-23-21 @ 06:19), Max: 36.6 (12-22-21 @ 14:33)  T(F): 97.2 (12-23-21 @ 06:19), Max: 97.8 (12-22-21 @ 14:33)  HR: --  BP: --  BP(mean): --  RR: --  SpO2: --    Orthostatic VS  12-23-21 @ 09:05  Lying BP: --/-- HR: --  Sitting BP: 113/90 HR: 75  Standing BP: 126/85 HR: 89  Site: --  Mode: electronic  Orthostatic VS  12-22-21 @ 19:18  Lying BP: --/-- HR: --  Sitting BP: 136/87 HR: 83  Standing BP: 117/81 HR: 87  Site: --  Mode: --  Orthostatic VS  12-22-21 @ 08:16  Lying BP: --/-- HR: --  Sitting BP: 117/79 HR: 74  Standing BP: 112/72 HR: 81  Site: upper left arm  Mode: electronic  Orthostatic VS  12-21-21 @ 19:29  Lying BP: --/-- HR: --  Sitting BP: 128/73 HR: 70  Standing BP: 120/84 HR: 76  Site: --  Mode: --

## 2021-12-24 PROCEDURE — 99232 SBSQ HOSP IP/OBS MODERATE 35: CPT

## 2021-12-24 RX ADMIN — Medication 25 MILLIGRAM(S): at 20:37

## 2021-12-24 RX ADMIN — Medication 0.5 MILLIGRAM(S): at 09:35

## 2021-12-24 RX ADMIN — AMLODIPINE BESYLATE 5 MILLIGRAM(S): 2.5 TABLET ORAL at 09:35

## 2021-12-24 RX ADMIN — RISPERIDONE 4 MILLIGRAM(S): 4 TABLET ORAL at 20:37

## 2021-12-24 RX ADMIN — Medication 25 MILLIGRAM(S): at 09:34

## 2021-12-24 RX ADMIN — Medication 0.5 MILLIGRAM(S): at 20:36

## 2021-12-24 NOTE — BH INPATIENT PSYCHIATRY PROGRESS NOTE - NSBHCHARTREVIEWVS_PSY_A_CORE FT
Vital Signs Last 24 Hrs  T(C): 37 (12-24-21 @ 06:27), Max: 37 (12-24-21 @ 06:27)  T(F): 98.6 (12-24-21 @ 06:27), Max: 98.6 (12-24-21 @ 06:27)  HR: --  BP: --  BP(mean): --  RR: --  SpO2: --    Orthostatic VS  12-24-21 @ 06:50  Lying BP: --/-- HR: --  Sitting BP: 139/89 HR: 81  Standing BP: 122/83 HR: 88  Site: upper left arm  Mode: electronic  Orthostatic VS  12-23-21 @ 19:15  Lying BP: --/-- HR: --  Sitting BP: 146/85 HR: 84  Standing BP: 128/90 HR: 92  Site: --  Mode: --  Orthostatic VS  12-23-21 @ 09:05  Lying BP: --/-- HR: --  Sitting BP: 113/90 HR: 75  Standing BP: 126/85 HR: 89  Site: --  Mode: electronic  Orthostatic VS  12-22-21 @ 19:18  Lying BP: --/-- HR: --  Sitting BP: 136/87 HR: 83  Standing BP: 117/81 HR: 87  Site: --  Mode: --

## 2021-12-24 NOTE — BH INPATIENT PSYCHIATRY PROGRESS NOTE - NSBHFUPINTERVALHXFT_PSY_A_CORE
Patient is followed up for psychosis and depression.  Use of  #[id number] Sergio  Patient is followed up for psychosis and depression.  Use of  #  Patient is 51 year single  male (who needs  services) with PPH of MDD, anxiety polysubstance disorder (alcohol, and cannabis use, negative u-tox on admission), and history of substance induced psychosis.   Chart, medications and labs reviewed, with no acute findings.  Patient is discussed with nursing team.  Per nursing patient remains compliant with all standing medications and tolerating well.  Patient remains in paper scrubs, remains on CO for SI, with unit restrictions due to risk of self harm.  No significant overnight issues. He remains somewhat disorganized.  Reports  mood as “I’m feeling better”  He denies SI/SIB/HI.  The patient denies any current AVH, mina or paranoia.  He denies any pain, reports daily BM.   He is eating and sleeping well.  He remains discharge focus stating he needs to go back to work, lacks insight in need for treatment. No acute medical concerns, reports had leg pain yesterday was given pain medications, denies pain today, VSS. Continue to provide therapeutic support. Continue treatment for risk modification

## 2021-12-24 NOTE — BH INPATIENT PSYCHIATRY PROGRESS NOTE - NSBHASSESSSUMMFT_PSY_ALL_CORE
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 in 2020 with diagnoses of Alcohol Induced Psychotic d/o; no known prior suicidality/aggression/legal/medical hx; bib ems, activated by pt's boss when he reported he felt lightheaded and fainted at work. Psych consult called by initial ED provider who stated pt reported CAH to kill himself. Transferred to [facility] for CAH/psychosis.     The patient continues to be disorganized, unpredictable.  His behavior has been better controlled, and he is minimizing symptoms, but he reports still feeling unsafe.  Continue 1:1, but will allow for utensils, and blanket.    PLAN:   Will continue current management--increase Risperdal 4mg qhs for psychosis.   PRN Haldol 2mg PO/IM Q6H and Ativan 1mg PO/IM for agitation, PRN Trazodone 50mg PO QHS for insomnia. Patient encouraged to remain treatment adherent.  Continue CO as patient reports not feeling safe, remains disorganized.

## 2021-12-25 PROCEDURE — 99232 SBSQ HOSP IP/OBS MODERATE 35: CPT

## 2021-12-25 RX ADMIN — RISPERIDONE 4 MILLIGRAM(S): 4 TABLET ORAL at 20:32

## 2021-12-25 RX ADMIN — Medication 0.5 MILLIGRAM(S): at 09:55

## 2021-12-25 RX ADMIN — QUETIAPINE FUMARATE 50 MILLIGRAM(S): 200 TABLET, FILM COATED ORAL at 20:32

## 2021-12-25 RX ADMIN — Medication 0.5 MILLIGRAM(S): at 20:31

## 2021-12-25 RX ADMIN — Medication 25 MILLIGRAM(S): at 09:55

## 2021-12-25 RX ADMIN — AMLODIPINE BESYLATE 5 MILLIGRAM(S): 2.5 TABLET ORAL at 09:55

## 2021-12-25 RX ADMIN — Medication 25 MILLIGRAM(S): at 20:31

## 2021-12-25 NOTE — BH INPATIENT PSYCHIATRY PROGRESS NOTE - NSBHCHARTREVIEWVS_PSY_A_CORE FT
Vital Signs Last 24 Hrs  T(C): 37.1 (12-25-21 @ 06:17), Max: 37.1 (12-25-21 @ 06:17)  T(F): 98.7 (12-25-21 @ 06:17), Max: 98.7 (12-25-21 @ 06:17)  HR: --  BP: --  BP(mean): --  RR: 18 (12-24-21 @ 20:19) (18 - 18)  SpO2: 99% (12-24-21 @ 20:19) (99% - 99%)    Orthostatic VS  12-25-21 @ 08:41  Lying BP: --/-- HR: --  Sitting BP: 158/91 HR: 106  Standing BP: 145/88 HR: 81  Site: --  Mode: --  Orthostatic VS  12-24-21 @ 18:44  Lying BP: --/-- HR: --  Sitting BP: 120/72 HR: 84  Standing BP: 118/76 HR: 70  Site: --  Mode: electronic  Orthostatic VS  12-24-21 @ 06:50  Lying BP: --/-- HR: --  Sitting BP: 139/89 HR: 81  Standing BP: 122/83 HR: 88  Site: upper left arm  Mode: electronic  Orthostatic VS  12-23-21 @ 19:15  Lying BP: --/-- HR: --  Sitting BP: 146/85 HR: 84  Standing BP: 128/90 HR: 92  Site: --  Mode: --  Orthostatic VS  12-23-21 @ 09:05  Lying BP: --/-- HR: --  Sitting BP: 113/90 HR: 75  Standing BP: 126/85 HR: 89  Site: --  Mode: electronic

## 2021-12-25 NOTE — BH INPATIENT PSYCHIATRY PROGRESS NOTE - PRN MEDS
MEDICATIONS  (PRN):  acetaminophen     Tablet .. 650 milliGRAM(s) Oral every 6 hours PRN Moderate Pain (4 - 6), Severe Pain (7 - 10)  diphenhydrAMINE 50 milliGRAM(s) Oral every 6 hours PRN agitation/anxiety  diphenhydrAMINE Injectable 50 milliGRAM(s) IntraMuscular once PRN agitation  haloperidol     Tablet 5 milliGRAM(s) Oral every 6 hours PRN aggression  haloperidol    Injectable 5 milliGRAM(s) IntraMuscular once PRN agitation  QUEtiapine 50 milliGRAM(s) Oral at bedtime PRN insomnia  traZODone 50 milliGRAM(s) Oral at bedtime PRN Insomnia

## 2021-12-25 NOTE — BH INPATIENT PSYCHIATRY PROGRESS NOTE - CURRENT MEDICATION
MEDICATIONS  (STANDING):  amLODIPine   Tablet 5 milliGRAM(s) Oral daily  clonazePAM Oral Disintegrating Tablet 0.5 milliGRAM(s) Oral two times a day  metoprolol tartrate 25 milliGRAM(s) Oral two times a day  risperiDONE   Tablet 4 milliGRAM(s) Oral at bedtime    MEDICATIONS  (PRN):  acetaminophen     Tablet .. 650 milliGRAM(s) Oral every 6 hours PRN Moderate Pain (4 - 6), Severe Pain (7 - 10)  diphenhydrAMINE 50 milliGRAM(s) Oral every 6 hours PRN agitation/anxiety  diphenhydrAMINE Injectable 50 milliGRAM(s) IntraMuscular once PRN agitation  haloperidol     Tablet 5 milliGRAM(s) Oral every 6 hours PRN aggression  haloperidol    Injectable 5 milliGRAM(s) IntraMuscular once PRN agitation  QUEtiapine 50 milliGRAM(s) Oral at bedtime PRN insomnia  traZODone 50 milliGRAM(s) Oral at bedtime PRN Insomnia

## 2021-12-25 NOTE — BH INPATIENT PSYCHIATRY PROGRESS NOTE - NSBHFUPINTERVALHXFT_PSY_A_CORE
Patient is followed up for psychosis and depression.  Use of  # [id number] Cristian  Patient is 51 year single  male (who needs  services) with PPH of MDD, anxiety polysubstance disorder (alcohol, and cannabis use, negative u-tox on admission), and history of substance induced psychosis.   Chart, medications and labs reviewed, with no acute findings.  Patient is discussed with nursing team.  Per nursing patient remains compliant with all standing medications and tolerating well.  Patient remains in paper scrubs, remains on CO for SI, with unit restrictions due to risk of self harm.  No significant overnight issues. He remains somewhat disorganized but improving.  Reports  mood as “I’m better”  He denies SI/SIB/HI.  The patient denies any current AVH, mina or paranoia.  He denies any pain, reports daily BM, reports cough but no other symptoms.   He is eating and sleeping well.  No acute medical concerns, VSS. Continue to provide therapeutic support. Continue treatment for risk modification

## 2021-12-25 NOTE — BH INPATIENT PSYCHIATRY PROGRESS NOTE - NSBHASSESSSUMMFT_PSY_ALL_CORE
51M, Divehi-speaking, originally from [facility], single, employed washing dishes at a restaurant, domiciled with four roommates including his brother, per psyckes a hx of etoh use disorders, cannabis use disorder, substance induced psychotic d/o, anxiety and major depressive disorder; one prior psychiatric hospitalization at [facility] in 2020 with diagnoses of Alcohol Induced Psychotic d/o; no known prior suicidality/aggression/legal/medical hx; bib ems, activated by pt's boss when he reported he felt lightheaded and fainted at work. Psych consult called by initial ED provider who stated pt reported CAH to kill himself. Transferred to [facility] for CAH/psychosis.     The patient continues to be disorganized, unpredictable.  His behavior has been better controlled, and he is minimizing symptoms, but he reports still feeling unsafe.  Continue 1:1, but will allow for utensils, and blanket.    PLAN:   Will continue current management--increase Risperdal 4mg qhs for psychosis.   PRN Haldol 2mg PO/IM Q6H and Ativan 1mg PO/IM for agitation, PRN Trazodone 50mg PO QHS for insomnia. Patient encouraged to remain treatment adherent.  Continue CO as patient reports not feeling safe, remains disorganized.

## 2021-12-26 PROCEDURE — 99232 SBSQ HOSP IP/OBS MODERATE 35: CPT

## 2021-12-26 RX ADMIN — Medication 0.5 MILLIGRAM(S): at 20:16

## 2021-12-26 RX ADMIN — Medication 25 MILLIGRAM(S): at 20:16

## 2021-12-26 RX ADMIN — QUETIAPINE FUMARATE 50 MILLIGRAM(S): 200 TABLET, FILM COATED ORAL at 20:16

## 2021-12-26 RX ADMIN — Medication 25 MILLIGRAM(S): at 08:55

## 2021-12-26 RX ADMIN — RISPERIDONE 4 MILLIGRAM(S): 4 TABLET ORAL at 20:15

## 2021-12-26 RX ADMIN — AMLODIPINE BESYLATE 5 MILLIGRAM(S): 2.5 TABLET ORAL at 08:55

## 2021-12-26 RX ADMIN — Medication 0.5 MILLIGRAM(S): at 08:55

## 2021-12-26 NOTE — BH INPATIENT PSYCHIATRY PROGRESS NOTE - NSBHASSESSSUMMFT_PSY_ALL_CORE
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 Psych consult called by initial ED provider who stated pt reported CAH to kill himself. Transferred to [facility] for CAH/psychosis.     The patient continues to be disorganized, unpredictable.  His behavior has been better controlled, and he is minimizing symptoms, but he reports still feeling unsafe.  Continue 1:1, but will allow for utensils, and blanket.    PLAN:   Will continue current management--increase Risperdal 4mg qhs for psychosis.   PRN Haldol 2mg PO/IM Q6H and Ativan 1mg PO/IM for agitation, PRN Trazodone 50mg PO QHS for insomnia. Patient encouraged to remain treatment adherent.  Continue CO as patient reports not feeling safe, remains disorganized.

## 2021-12-26 NOTE — BH INPATIENT PSYCHIATRY PROGRESS NOTE - NSBHFUPINTERVALHXFT_PSY_A_CORE
Patient is followed up for psychosis and depression.  Use of  # [id number] Geronimo  Patient is followed up for psychosis and depression.  Use of  # [id number] Cristian  Patient is 51 year single  male (who needs  services) with PPH of MDD, anxiety polysubstance disorder (alcohol, and cannabis use, negative u-tox on admission), and history of substance induced psychosis.   Chart, medications and labs reviewed, with no acute findings.  Patient is discussed with nursing team.  Per nursing patient remains compliant with all standing medications and tolerating well.  Patient remains in paper scrubs, remains on CO. No significant overnight issues.   Remains disorganized, but is improving, slightly brighter affect.  Reports mood as “good” He denies SI “I have no reason to kill myself” ”I want to see my family”   denies SIB/HI.  The patient denies any current AVH, mina or paranoia.  He reports bask pain since yesterday, refused pain medications, daily BM.  He is eating and sleeping well.  No acute medical concerns, VSS. Continue to provide therapeutic support. Continue treatment for risk modification. Will reevaluate need for CO in the am, pt’s symptoms improving may be able to come off CO on 12/27.

## 2021-12-26 NOTE — BH INPATIENT PSYCHIATRY PROGRESS NOTE - NSBHCHARTREVIEWVS_PSY_A_CORE FT
Vital Signs Last 24 Hrs  T(C): 36.7 (12-25-21 @ 22:22), Max: 36.7 (12-25-21 @ 22:22)  T(F): 98 (12-25-21 @ 22:22), Max: 98 (12-25-21 @ 22:22)  HR: --  BP: --  BP(mean): --  RR: 18 (12-25-21 @ 20:58) (18 - 18)  SpO2: 100% (12-25-21 @ 20:58) (100% - 100%)    Orthostatic VS  12-25-21 @ 18:50  Lying BP: --/-- HR: --  Sitting BP: 156/97 HR: 89  Standing BP: 142/99 HR: 94  Site: --  Mode: --  Orthostatic VS  12-25-21 @ 08:41  Lying BP: --/-- HR: --  Sitting BP: 158/91 HR: 106  Standing BP: 145/88 HR: 81  Site: --  Mode: --  Orthostatic VS  12-24-21 @ 18:44  Lying BP: --/-- HR: --  Sitting BP: 120/72 HR: 84  Standing BP: 118/76 HR: 70  Site: --  Mode: electronic

## 2021-12-27 PROCEDURE — 99232 SBSQ HOSP IP/OBS MODERATE 35: CPT

## 2021-12-27 RX ORDER — HALOPERIDOL DECANOATE 100 MG/ML
5 INJECTION INTRAMUSCULAR ONCE
Refills: 0 | Status: DISCONTINUED | OUTPATIENT
Start: 2021-12-27 | End: 2022-01-05

## 2021-12-27 RX ORDER — DIPHENHYDRAMINE HCL 50 MG
50 CAPSULE ORAL ONCE
Refills: 0 | Status: DISCONTINUED | OUTPATIENT
Start: 2021-12-27 | End: 2022-01-05

## 2021-12-27 RX ORDER — RISPERIDONE 4 MG/1
1 TABLET ORAL DAILY
Refills: 0 | Status: DISCONTINUED | OUTPATIENT
Start: 2021-12-27 | End: 2022-01-03

## 2021-12-27 RX ADMIN — Medication 0.5 MILLIGRAM(S): at 21:04

## 2021-12-27 RX ADMIN — AMLODIPINE BESYLATE 5 MILLIGRAM(S): 2.5 TABLET ORAL at 08:07

## 2021-12-27 RX ADMIN — RISPERIDONE 4 MILLIGRAM(S): 4 TABLET ORAL at 21:05

## 2021-12-27 RX ADMIN — Medication 25 MILLIGRAM(S): at 21:05

## 2021-12-27 RX ADMIN — Medication 0.5 MILLIGRAM(S): at 08:07

## 2021-12-27 RX ADMIN — QUETIAPINE FUMARATE 50 MILLIGRAM(S): 200 TABLET, FILM COATED ORAL at 21:06

## 2021-12-27 RX ADMIN — Medication 25 MILLIGRAM(S): at 08:08

## 2021-12-27 NOTE — BH INPATIENT PSYCHIATRY PROGRESS NOTE - NSBHASSESSSUMMFT_PSY_ALL_CORE
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 in 2020 with diagnoses of Alcohol Induced Psychotic d/o; no known prior suicidality/aggression/legal/medical hx; bib ems, activated by pt's boss when he reported he felt lightheaded and fainted at work. Psych consult called by initial ED provider who stated pt reported CAH to kill himself. Transferred to [facility] for CAH/psychosis.     The patient continues to be disorganized, depressed, isolative, withdrawn and suicidal.     PLAN:     Psychiatric:   Risperdal 4mg qhs for psychosis.   Klonopin 0.5 mg PO Qhs   Trazadone 50 mg PO Qhs     PRN   Haldol 2mg PO/IM Q6H and Ativan 1mg PO/IM for agitation,   PRN Trazodone 50mg PO QHS for insomnia. P    Continue CO as patient reports not feeling safe, remains disorganized.  Reduction in symptoms

## 2021-12-27 NOTE — BH INPATIENT PSYCHIATRY PROGRESS NOTE - MSE UNSTRUCTURED FT
The patient appears stated age, fair hygiene, dressed appropriately in gowns.  He was calm, makes attempts to be cooperative with the interview.  He maintained appropriate eye contact.  No psychomotor agitation or retardation.  Steady gait.  The patient’s speech was fluent in Moldovan, normal in tone, decreased in rate, and low in volume.  The patient’s mood is “okay.”  Affect is constricted, stable, appropriate.  The patient’s thoughts are less disorganized, but still tangential, illogical, inconsistent.  He denies any current delusions or hallucinations.  He denies any current suicidal or homicidal ideation, intent, or plan, but then also states he doesn't feel safe.  Insight is limited.  Judgment is impaired.  Impulse control has been tenuous on the unit.  
The patient appears stated age, fair hygiene, dressed appropriately in gowns.  He was calm, makes attempts to be cooperative with the interview.  He maintained appropriate eye contact.  No psychomotor agitation or retardation.  Steady gait.  The patient’s speech was fluent in East Timorese, normal in tone, decreased in rate, and low in volume.  The patient’s mood is “okay.”  Affect is constricted, stable, appropriate.  The patient’s thoughts are less disorganized, but still tangential, illogical, inconsistent.  He denies any current delusions or hallucinations.  He denies any current suicidal or homicidal ideation, intent, or plan, but then also states he doesn't feel safe.  Insight is limited.  Judgment is impaired.  Impulse control has been tenuous on the unit.  
MAR/BEH: Adult male in no acute distress; dressed in hospital gown. Guarded, superficially cooperative.  SPEECH: low in tone, not pressured.   MOTOR: No PMR/PMA; no other abnormal movements.   MOOD: “fine"  AFFECT: Irritable  THOUGHT PROCESS: [place].   THOUGHT CONTENT:  Denies SI/HI, denied paranoia.   PERCEPTIONS: Denied AVH. Appears internally preoccupied.   INSIGHT: Poor.   JUDGMENT: Poor.  IMPULSE CONTROL: Intact at the time of exam.  
The patient appears stated age, fair hygiene, dressed appropriately in gowns.  He was calm, makes attempts to be cooperative with the interview.  He maintained appropriate eye contact.  No psychomotor agitation or retardation.  Steady gait.  The patient’s speech was fluent in Brazilian, normal in tone, decreased in rate, and low in volume.  The patient’s mood is “better.”  Affect is constricted, stable, appropriate.  The patient’s thoughts are disorganized, tangential, illogical, inconsistent.  He denies any current delusions or hallucinations.  He denies any current suicidal or homicidal ideation, intent, or plan, but then also states he doesn't feel safe.  Insight is limited.  Judgment is impaired.  Impulse control has been tenuous on the unit.  
The patient appears stated age, fair hygiene, dressed appropriately in gowns.  He was calm, makes attempts to be cooperative with the interview.  He maintained appropriate eye contact.  No psychomotor agitation or retardation.  Steady gait.  The patient’s speech was fluent in Malaysian, normal in tone, decreased in rate, and low in volume.  The patient’s mood is “okay.”  Affect is constricted, stable, appropriate.  The patient’s thoughts are less disorganized, but still tangential, illogical, inconsistent.  He denies any current delusions or hallucinations.  He denies any current suicidal or homicidal ideation, intent, or plan, but then also states he doesn't feel safe.  Insight is limited.  Judgment is impaired.  Impulse control has been tenuous on the unit.  
MAR/BEH: Adult male in no acute distress; dressed in hospital gown; sleepy yet somewhat arousable, uncooperative; poor eye contact.   SPEECH: normal rate, tone, volume; diminished production   MOTOR: No PMR/PMA; no other abnormal movements.   MOOD: “don't feel well"  AFFECT: depressed; blunted, constricted, limited range.   THOUGHT PROCESS: impoverished.   THOUGHT CONTENT: Denies SI/HI; no evidence of delusions.   PERCEPTIONS: Unable to determine AVH. Does not appear internally preoccupied.   COGNITION: Grossly intact.   INSIGHT: Poor.   JUDGMENT: Poor.  IMPULSE CONTROL: Intact.  
The patient appears stated age, fair hygiene, dressed appropriately in gowns.  He was calm, makes attempts to be cooperative with the interview.  He maintained appropriate eye contact.  No psychomotor agitation or retardation.  Steady gait.  The patient’s speech was fluent in Colombian, normal in tone, decreased in rate, and low in volume.  The patient’s mood is “okay.”  Affect is constricted, stable, appropriate.  The patient’s thoughts are less disorganized, but still tangential, illogical, inconsistent.  He denies any current delusions or hallucinations.  He denies any current suicidal or homicidal ideation, intent, or plan, but then also states he doesn't feel safe.  Insight is limited.  Judgment is impaired.  Impulse control has been tenuous on the unit.  
MAR/BEH: Adult male in no acute distress; dressed in hospital gowns  SPEECH: Pressured and loud   MOTOR: No PMR/PMA; no other abnormal movements.   MOOD: “I need to leave"  AFFECT: Irritable  THOUGHT PROCESS: Tangential   THOUGHT CONTENT: Paranoid thought content about people trying to hurt him. Denies SI/HI  PERCEPTIONS: Unable to determine AVH. Appears internally preoccupied.   COGNITION: Grossly intact.   INSIGHT: Poor.   JUDGMENT: Poor.  IMPULSE CONTROL: Intact at the time of exam.  
The patient appears stated age, fair hygiene, dressed appropriately in gowns.  He was calm, makes attempts to be cooperative with the interview.  He maintained appropriate eye contact.  No psychomotor agitation or retardation.  Steady gait.  The patient’s speech was fluent in Brazilian, normal in tone, decreased in rate, and low in volume.  The patient’s mood is “okay.”  Affect is constricted, stable, appropriate.  The patient’s thoughts are less disorganized, but still tangential, illogical, inconsistent.  He denies any current delusions or hallucinations.  He denies any current suicidal or homicidal ideation, intent, or plan, but then also states he doesn't feel safe.  Insight is limited.  Judgment is impaired.  Impulse control has been tenuous on the unit.  
MAR/BEH: Adult male in no acute distress; dressed in hospital gown. Guarded, superficially cooperative.  SPEECH: low in tone, not pressured.   MOTOR: No PMR/PMA; no other abnormal movements.   MOOD: “fine"  AFFECT: Irritable  THOUGHT PROCESS: [place].   THOUGHT CONTENT:  Denies SI/HI, denied paranoia.   PERCEPTIONS: Denied AVH. Appears internally preoccupied.   INSIGHT: Poor.   JUDGMENT: Poor.  IMPULSE CONTROL: Intact at the time of exam.  
The patient appears stated age, fair hygiene, dressed appropriately in gowns.  He was calm, makes attempts to be cooperative with the interview.  He maintained appropriate eye contact.  No psychomotor agitation or retardation.  Steady gait.  The patient’s speech was fluent in Mozambican, normal in tone, decreased in rate, and low in volume.  The patient’s mood is “okay.”  Affect is constricted, stable, appropriate.  The patient’s thoughts are less disorganized, but still tangential, illogical, inconsistent.  He denies any current delusions or hallucinations.  He denies any current suicidal or homicidal ideation, intent, or plan, but then also states he doesn't feel safe.  Insight is limited.  Judgment is impaired.  Impulse control has been tenuous on the unit.  
The patient appears stated age, fair hygiene, dressed appropriately in gowns.  He was calm, makes attempts to be cooperative with the interview.  He maintained appropriate eye contact.  No psychomotor agitation or retardation.  Steady gait.  The patient’s speech was fluent in Sri Lankan, normal in tone, decreased in rate, and low in volume.  The patient’s mood is “okay.”  Affect is constricted, stable, appropriate.  The patient’s thoughts are less disorganized, but still tangential, illogical, inconsistent.  He denies any current delusions or hallucinations.  He denies any current suicidal or homicidal ideation, intent, or plan, but then also states he doesn't feel safe.  Insight is limited.  Judgment is impaired.  Impulse control has been tenuous on the unit.

## 2021-12-27 NOTE — BH TREATMENT PLAN - NSTXSUICIDINTERPR_PSY_ALL_CORE
Carlos has made fair progress, as patient has not engaged in SIB. Patient remains on CO, as patient remains unable to contract for safety.

## 2021-12-27 NOTE — BH TREATMENT PLAN - NSTXSUICIDINTERRN_PSY_ALL_CORE
Encourage verbalization of feelings and concerns. Assess mood/behavior. Assess for suicidal ideation and plan. Establish therapeutic relationship. Encourage patient to notify staff when feeling self-injurious or suicidal.  Maintain safe, supportive and structured environment. Reassure patient of supportive care. Environment and belongings checked for sharps. Assess patient for S/I/i/p and or desire to self-injure. Provide emotional support. Listen carefully for suicidal statements and observe for non-verbal indications of suicidal intent. Provide PRN medications as ordered.

## 2021-12-27 NOTE — BH INPATIENT PSYCHIATRY PROGRESS NOTE - CURRENT MEDICATION
MEDICATIONS  (STANDING):  amLODIPine   Tablet 5 milliGRAM(s) Oral daily  clonazePAM Oral Disintegrating Tablet 0.5 milliGRAM(s) Oral two times a day  metoprolol tartrate 25 milliGRAM(s) Oral two times a day  risperiDONE   Tablet 4 milliGRAM(s) Oral at bedtime    MEDICATIONS  (PRN):  acetaminophen     Tablet .. 650 milliGRAM(s) Oral every 6 hours PRN Moderate Pain (4 - 6), Severe Pain (7 - 10)  diphenhydrAMINE 50 milliGRAM(s) Oral every 6 hours PRN agitation/anxiety  diphenhydrAMINE Injectable 50 milliGRAM(s) IntraMuscular once PRN agitation  diphenhydrAMINE Injectable 50 milliGRAM(s) IntraMuscular once PRN agitation  haloperidol     Tablet 5 milliGRAM(s) Oral every 6 hours PRN aggression  haloperidol    Injectable 5 milliGRAM(s) IntraMuscular once PRN agitation  haloperidol    Injectable 5 milliGRAM(s) IntraMuscular once PRN agitation  LORazepam   Injectable 2 milliGRAM(s) IntraMuscular once PRN Agitation due to psychosis  QUEtiapine 50 milliGRAM(s) Oral at bedtime PRN insomnia  traZODone 50 milliGRAM(s) Oral at bedtime PRN Insomnia   MEDICATIONS  (STANDING):  amLODIPine   Tablet 5 milliGRAM(s) Oral daily  clonazePAM Oral Disintegrating Tablet 0.5 milliGRAM(s) Oral two times a day  metoprolol tartrate 25 milliGRAM(s) Oral two times a day  risperiDONE   Tablet 4 milliGRAM(s) Oral at bedtime  risperiDONE  Disintegrating Tablet 1 milliGRAM(s) Oral daily    MEDICATIONS  (PRN):  acetaminophen     Tablet .. 650 milliGRAM(s) Oral every 6 hours PRN Moderate Pain (4 - 6), Severe Pain (7 - 10)  diphenhydrAMINE 50 milliGRAM(s) Oral every 6 hours PRN agitation/anxiety  diphenhydrAMINE Injectable 50 milliGRAM(s) IntraMuscular once PRN agitation  diphenhydrAMINE Injectable 50 milliGRAM(s) IntraMuscular once PRN agitation  haloperidol     Tablet 5 milliGRAM(s) Oral every 6 hours PRN aggression  haloperidol    Injectable 5 milliGRAM(s) IntraMuscular once PRN agitation  haloperidol    Injectable 5 milliGRAM(s) IntraMuscular once PRN agitation  LORazepam   Injectable 2 milliGRAM(s) IntraMuscular once PRN Agitation due to psychosis  QUEtiapine 50 milliGRAM(s) Oral at bedtime PRN insomnia  traZODone 50 milliGRAM(s) Oral at bedtime PRN Insomnia

## 2021-12-27 NOTE — BH TREATMENT PLAN - NSTXPSYCHOGOAL_PSY_ALL_CORE
Will identify 2 coping skills that help mitigate hallucinations
Will identify 2 coping skills that help mitigate hallucinations
Will identify 1 trigger/stressor that exacerbates hallucinations

## 2021-12-27 NOTE — BH TREATMENT PLAN - NSTXDCOPLKINTERSW_PSY_ALL_CORE
SW will provide support, psychoeducation, and discharge planning to patient and identified supports, while coordinating his care with the interdisciplinary treatment team.
SW participated in interdisciplinary treatment team meeting to coordinate patient care and discharge plans. SW provided patient with supportive therapy and psycho-education.  SW continuously meets with the treatment team to evaluate and assist in discharge planning.

## 2021-12-27 NOTE — BH INPATIENT PSYCHIATRY PROGRESS NOTE - PRN MEDS
MEDICATIONS  (PRN):  acetaminophen     Tablet .. 650 milliGRAM(s) Oral every 6 hours PRN Moderate Pain (4 - 6), Severe Pain (7 - 10)  diphenhydrAMINE 50 milliGRAM(s) Oral every 6 hours PRN agitation/anxiety  diphenhydrAMINE Injectable 50 milliGRAM(s) IntraMuscular once PRN agitation  diphenhydrAMINE Injectable 50 milliGRAM(s) IntraMuscular once PRN agitation  haloperidol     Tablet 5 milliGRAM(s) Oral every 6 hours PRN aggression  haloperidol    Injectable 5 milliGRAM(s) IntraMuscular once PRN agitation  haloperidol    Injectable 5 milliGRAM(s) IntraMuscular once PRN agitation  LORazepam   Injectable 2 milliGRAM(s) IntraMuscular once PRN Agitation due to psychosis  QUEtiapine 50 milliGRAM(s) Oral at bedtime PRN insomnia  traZODone 50 milliGRAM(s) Oral at bedtime PRN Insomnia

## 2021-12-27 NOTE — BH INPATIENT PSYCHIATRY PROGRESS NOTE - NSBHCHARTREVIEWVS_PSY_A_CORE FT
Vital Signs Last 24 Hrs  T(C): 36 (12-27-21 @ 06:22), Max: 36.7 (12-26-21 @ 20:00)  T(F): 96.8 (12-27-21 @ 06:22), Max: 98.1 (12-26-21 @ 20:00)  HR: --  BP: --  BP(mean): --  RR: 18 (12-26-21 @ 20:00) (18 - 18)  SpO2: 100% (12-26-21 @ 20:00) (100% - 100%)    Orthostatic VS  12-27-21 @ 08:53  Lying BP: --/-- HR: --  Sitting BP: 142/85 HR: 78  Standing BP: 136/77 HR: 83  Site: upper left arm  Mode: electronic  Orthostatic VS  12-26-21 @ 20:00  Lying BP: --/-- HR: --  Sitting BP: 110/78 HR: 82  Standing BP: 116/70 HR: 78  Site: --  Mode: --  Orthostatic VS  12-25-21 @ 18:50  Lying BP: --/-- HR: --  Sitting BP: 156/97 HR: 89  Standing BP: 142/99 HR: 94  Site: --  Mode: --   Vital Signs Last 24 Hrs  T(C): 36.3 (12-27-21 @ 14:39), Max: 36.7 (12-26-21 @ 20:00)  T(F): 97.3 (12-27-21 @ 14:39), Max: 98.1 (12-26-21 @ 20:00)  HR: --  BP: --  BP(mean): --  RR: 18 (12-26-21 @ 20:00) (18 - 18)  SpO2: 100% (12-26-21 @ 20:00) (100% - 100%)    Orthostatic VS  12-27-21 @ 08:53  Lying BP: --/-- HR: --  Sitting BP: 142/85 HR: 78  Standing BP: 136/77 HR: 83  Site: upper left arm  Mode: electronic  Orthostatic VS  12-26-21 @ 20:00  Lying BP: --/-- HR: --  Sitting BP: 110/78 HR: 82  Standing BP: 116/70 HR: 78  Site: --  Mode: --  Orthostatic VS  12-25-21 @ 18:50  Lying BP: --/-- HR: --  Sitting BP: 156/97 HR: 89  Standing BP: 142/99 HR: 94  Site: --  Mode: --

## 2021-12-27 NOTE — BH TREATMENT PLAN - NSTXPLANTHERAPYSESSIONSFT_PSY_ALL_CORE
Spoke with dad. He just wanted Dr. Donahue to be aware that mom has had 3 relapses since Yaneth's last appointment but is now home. She is very angry and upset toward dad and keeps bringing up divorce when Yaneth is present. Dad says Yaneth tries to tune it out, but is also  \"getting a voice\" and projects her feelings to mom. This is not in an angry way, but she is speaking up for herself.       12-24-21  --  Type of session: Individual  Level of patient participation: Engaged  Duration of participation: Less than 15 minutes  Therapy conducted by: Psych rehab  Therapy Summary: Writer approached pateint in room. Pt remains on CO 1:1 for SI. Session was held in Dutch, as writer is fluent in Dutch and pt is Dutch speaking only.  Pt states he denies SI and denies AH, and is able to contract for safety. According to team however, pt remains unclear, as he states he wants to  continue CO and continues to endorse AH with no intent to follow direction of voices.  Writer encouraged patient to continue utilizing coping skills such as reading Dutch bible. Patient was receptive.     Patient has not attended any psychiatric rehabilitation groups despite daily prompting and encouragement from staff. Groups are currently suspended onunit due to recent COVID 19 outbreak.

## 2021-12-27 NOTE — BH TREATMENT PLAN - NSTXCOMMGOAL_PSY_ALL_CORE
Will utilize  services or communication board to facilitate communication

## 2021-12-27 NOTE — BH INPATIENT PSYCHIATRY PROGRESS NOTE - NSBHFUPINTERVALHXFT_PSY_A_CORE
nterval History	Patient is followed up for psychosis and depression.  Use of  # [id number] Geronimo     Patient is followed up for psychosis and depression.  Use of  # [id number] Cristian     Patient is 51 year single  male (who needs  services) with PPH of MDD, anxiety polysubstance disorder (alcohol, and cannabis use, negative u-tox on admission), and history of substance induced psychosis.      Chart, medications and labs reviewed, with no acute findings.  Patient is discussed with nursing team.  Per nursing patient remains compliant with all standing medications and tolerating well.  Patient remains in paper scrubs, remains on CO. No significant overnight issues.      Remains disorganized, but is improving, slightly brighter affect.  Reports mood as “good” He denies SI “I have no reason to kill myself” ”I want to see my family”   denies SIB/HI.  The patient denies any current AVH, mina or paranoia.  He reports bask pain since yesterday, refused pain medications, daily BM.  He is eating and sleeping well.  No acute medical concerns, VSS. Continue to provide therapeutic support. Continue treatment for risk modification. Will reevaluate need for CO in the am, pt’s symptoms improving may be able to come off CO on 12/27.  Chart reviewed and patient interviewed. Discussed with treatment team.  Staff member Gallito dotson. Remains disorganized but improving. Reports feeling "anxious”. Patient states“I have no reason to kill myself” ”I want to see my family”. Patient denies SIB/HI.  The patient denies any current AVH, mina or paranoia. Patient reports eating and sleeping well.  No acute medical concerns, VSS. As per staff patient is depressed, isolative and withdrawn.

## 2021-12-28 LAB — SARS-COV-2 RNA SPEC QL NAA+PROBE: SIGNIFICANT CHANGE UP

## 2021-12-28 PROCEDURE — 99232 SBSQ HOSP IP/OBS MODERATE 35: CPT

## 2021-12-28 RX ORDER — CLONAZEPAM 1 MG
0.5 TABLET ORAL DAILY
Refills: 0 | Status: DISCONTINUED | OUTPATIENT
Start: 2021-12-28 | End: 2022-01-03

## 2021-12-28 RX ADMIN — Medication 25 MILLIGRAM(S): at 08:04

## 2021-12-28 RX ADMIN — RISPERIDONE 4 MILLIGRAM(S): 4 TABLET ORAL at 20:10

## 2021-12-28 RX ADMIN — Medication 25 MILLIGRAM(S): at 20:10

## 2021-12-28 RX ADMIN — Medication 0.5 MILLIGRAM(S): at 08:57

## 2021-12-28 RX ADMIN — AMLODIPINE BESYLATE 5 MILLIGRAM(S): 2.5 TABLET ORAL at 08:04

## 2021-12-28 RX ADMIN — RISPERIDONE 1 MILLIGRAM(S): 4 TABLET ORAL at 08:04

## 2021-12-28 NOTE — BH INPATIENT PSYCHIATRY PROGRESS NOTE - NSBHCHARTREVIEWVS_PSY_A_CORE FT
Vital Signs Last 24 Hrs  T(C): 37.1 (12-28-21 @ 06:28), Max: 37.2 (12-27-21 @ 19:24)  T(F): 98.7 (12-28-21 @ 06:28), Max: 99 (12-27-21 @ 19:24)  HR: --  BP: --  BP(mean): --  RR: --  SpO2: --    Orthostatic VS  12-28-21 @ 08:04  Lying BP: --/-- HR: --  Sitting BP: 128/80 HR: 80  Standing BP: --/-- HR: --  Site: --  Mode: --  Orthostatic VS  12-27-21 @ 19:24  Lying BP: --/-- HR: --  Sitting BP: 155/93 HR: 102  Standing BP: 140/91 HR: 102  Site: --  Mode: --  Orthostatic VS  12-27-21 @ 08:53  Lying BP: --/-- HR: --  Sitting BP: 142/85 HR: 78  Standing BP: 136/77 HR: 83  Site: upper left arm  Mode: electronic  Orthostatic VS  12-26-21 @ 20:00  Lying BP: --/-- HR: --  Sitting BP: 110/78 HR: 82  Standing BP: 116/70 HR: 78  Site: --  Mode: --

## 2021-12-28 NOTE — BH INPATIENT PSYCHIATRY PROGRESS NOTE - CURRENT MEDICATION
MEDICATIONS  (STANDING):  amLODIPine   Tablet 5 milliGRAM(s) Oral daily  clonazePAM Oral Disintegrating Tablet 0.5 milliGRAM(s) Oral daily  metoprolol tartrate 25 milliGRAM(s) Oral two times a day  risperiDONE   Tablet 4 milliGRAM(s) Oral at bedtime  risperiDONE  Disintegrating Tablet 1 milliGRAM(s) Oral daily    MEDICATIONS  (PRN):  acetaminophen     Tablet .. 650 milliGRAM(s) Oral every 6 hours PRN Moderate Pain (4 - 6), Severe Pain (7 - 10)  diphenhydrAMINE 50 milliGRAM(s) Oral every 6 hours PRN agitation/anxiety  diphenhydrAMINE Injectable 50 milliGRAM(s) IntraMuscular once PRN agitation  diphenhydrAMINE Injectable 50 milliGRAM(s) IntraMuscular once PRN agitation  haloperidol     Tablet 5 milliGRAM(s) Oral every 6 hours PRN aggression  haloperidol    Injectable 5 milliGRAM(s) IntraMuscular once PRN agitation  haloperidol    Injectable 5 milliGRAM(s) IntraMuscular once PRN agitation  LORazepam   Injectable 2 milliGRAM(s) IntraMuscular once PRN Agitation due to psychosis  QUEtiapine 50 milliGRAM(s) Oral at bedtime PRN insomnia  traZODone 50 milliGRAM(s) Oral at bedtime PRN Insomnia

## 2021-12-28 NOTE — BH INPATIENT PSYCHIATRY PROGRESS NOTE - NSBHASSESSSUMMFT_PSY_ALL_CORE
51M, Frisian-speaking, originally from [facility], single, employed washing dishes at a restaurant, domiciled with four roommates including his brother, per psyckes a hx of etoh use disorders, cannabis use disorder, substance induced psychotic d/o, anxiety and major depressive disorder; one prior psychiatric hospitalization at [facility] in 2020 with diagnoses of Alcohol Induced Psychotic d/o; no known prior suicidality/aggression/legal/medical hx; bib ems, activated by pt's boss when he reported he felt lightheaded and fainted at work. Psych consult called by initial ED provider who stated pt reported CAH to kill himself. Transferred to [facility] for CAH/psychosis.     The patient continues to be disorganized, depressed, isolative, withdrawn and suicidal.     PLAN:     Psychiatric:   Risperdal 4mg qhs for psychosis.   Klonopin 0.5 mg PO Qhs   Trazadone 50 mg PO Qhs     PRN   Haldol 2mg PO/IM Q6H and Ativan 1mg PO/IM for agitation,   PRN Trazodone 50mg PO QHS for insomnia. P    Continue CO as patient reports not feeling safe, remains disorganized.  Reduction in symptoms     51M, Tamazight-speaking, originally from [facility], single, employed washing dishes at a restaurant, domiciled with four roommates including his brother, per psyckes a hx of etoh use disorders, cannabis use disorder, substance induced psychotic d/o, anxiety and major depressive disorder; one prior psychiatric hospitalization at [facility] in 2020 with diagnoses of Alcohol Induced Psychotic d/o; no known prior suicidality/aggression/legal/medical hx; bib ems, activated by pt's boss when he reported he felt lightheaded and fainted at work. Psych consult called by initial ED provider who stated pt reported CAH to kill himself. Transferred to [facility] for CAH/psychosis.     The patient continues to be disorganized, depressed, isolative, withdrawn and suicidal.     PLAN:     Psychiatric:   Risperdal 4mg qhs and Risperdal 1mg daily for psychosis.   dc Klonopin 0.5 mg PO Qhs   Trazadone 50 mg PO Qhs     PRN   Haldol 2mg PO/IM Q6H and Ativan 1mg PO/IM for agitation,   PRN Trazodone 50mg PO QHS for insomnia. P    Discontinue CO.  Reduction in symptoms

## 2021-12-28 NOTE — BH INPATIENT PSYCHIATRY PROGRESS NOTE - NSBHFUPINTERVALHXFT_PSY_A_CORE
Pt f/u SI, psychosis. Use of  #[id number] Pillo. Patient presents to interview in hospital garb and fair ADLs. No interval events. Patient is calm, compliant, remains slightly disorganized but improving. Patient states "I'm better." Patient reports good sleep and appetite. Patient denies SI. Patient is focused on discharge and repetitively states "I need to go back to work or I'll get evicted." Patient denies SIB/HI/AVH, paranoia. Pt is compliant with medications. Pt offers no complaints. Discussed with patient how he felt about discontinuing CO today, states "I feel okay." Pt engages in safety planning. Continue to provide therapeutic support. CO discontinued today. Continue treatment for risk modification. no SE reported.

## 2021-12-29 PROCEDURE — 99232 SBSQ HOSP IP/OBS MODERATE 35: CPT

## 2021-12-29 RX ORDER — NALTREXONE HYDROCHLORIDE 50 MG/1
25 TABLET, FILM COATED ORAL DAILY
Refills: 0 | Status: DISCONTINUED | OUTPATIENT
Start: 2021-12-30 | End: 2021-12-30

## 2021-12-29 RX ORDER — NALTREXONE HYDROCHLORIDE 50 MG/1
25 TABLET, FILM COATED ORAL AT BEDTIME
Refills: 0 | Status: COMPLETED | OUTPATIENT
Start: 2021-12-29 | End: 2021-12-29

## 2021-12-29 RX ADMIN — AMLODIPINE BESYLATE 5 MILLIGRAM(S): 2.5 TABLET ORAL at 08:14

## 2021-12-29 RX ADMIN — RISPERIDONE 4 MILLIGRAM(S): 4 TABLET ORAL at 20:30

## 2021-12-29 RX ADMIN — RISPERIDONE 1 MILLIGRAM(S): 4 TABLET ORAL at 08:14

## 2021-12-29 RX ADMIN — Medication 25 MILLIGRAM(S): at 08:14

## 2021-12-29 RX ADMIN — Medication 0.5 MILLIGRAM(S): at 08:14

## 2021-12-29 RX ADMIN — NALTREXONE HYDROCHLORIDE 25 MILLIGRAM(S): 50 TABLET, FILM COATED ORAL at 20:30

## 2021-12-29 RX ADMIN — Medication 25 MILLIGRAM(S): at 20:30

## 2021-12-29 RX ADMIN — QUETIAPINE FUMARATE 50 MILLIGRAM(S): 200 TABLET, FILM COATED ORAL at 20:30

## 2021-12-29 NOTE — BH INPATIENT PSYCHIATRY PROGRESS NOTE - NSBHCHARTREVIEWVS_PSY_A_CORE FT
Vital Signs Last 24 Hrs  T(C): 35.9 (12-29-21 @ 06:07), Max: 37 (12-28-21 @ 22:34)  T(F): 96.6 (12-29-21 @ 06:07), Max: 98.6 (12-28-21 @ 22:34)  HR: --  BP: --  BP(mean): --  RR: --  SpO2: --    Orthostatic VS  12-29-21 @ 07:52  Lying BP: --/-- HR: --  Sitting BP: 121/75 HR: 74  Standing BP: 102/86 HR: 89  Site: --  Mode: --  Orthostatic VS  12-28-21 @ 18:58  Lying BP: --/-- HR: --  Sitting BP: 143/91 HR: 83  Standing BP: 135/98 HR: 88  Site: --  Mode: --  Orthostatic VS  12-28-21 @ 08:04  Lying BP: --/-- HR: --  Sitting BP: 128/80 HR: 80  Standing BP: --/-- HR: --  Site: --  Mode: --  Orthostatic VS  12-27-21 @ 19:24  Lying BP: --/-- HR: --  Sitting BP: 155/93 HR: 102  Standing BP: 140/91 HR: 102  Site: --  Mode: --

## 2021-12-29 NOTE — BH INPATIENT PSYCHIATRY PROGRESS NOTE - CURRENT MEDICATION
MEDICATIONS  (STANDING):  amLODIPine   Tablet 5 milliGRAM(s) Oral daily  clonazePAM Oral Disintegrating Tablet 0.5 milliGRAM(s) Oral daily  metoprolol tartrate 25 milliGRAM(s) Oral two times a day  naltrexone 25 milliGRAM(s) Oral at bedtime  risperiDONE   Tablet 4 milliGRAM(s) Oral at bedtime  risperiDONE  Disintegrating Tablet 1 milliGRAM(s) Oral daily    MEDICATIONS  (PRN):  acetaminophen     Tablet .. 650 milliGRAM(s) Oral every 6 hours PRN Moderate Pain (4 - 6), Severe Pain (7 - 10)  diphenhydrAMINE 50 milliGRAM(s) Oral every 6 hours PRN agitation/anxiety  diphenhydrAMINE Injectable 50 milliGRAM(s) IntraMuscular once PRN agitation  diphenhydrAMINE Injectable 50 milliGRAM(s) IntraMuscular once PRN agitation  haloperidol     Tablet 5 milliGRAM(s) Oral every 6 hours PRN aggression  haloperidol    Injectable 5 milliGRAM(s) IntraMuscular once PRN agitation  haloperidol    Injectable 5 milliGRAM(s) IntraMuscular once PRN agitation  LORazepam   Injectable 2 milliGRAM(s) IntraMuscular once PRN Agitation due to psychosis  QUEtiapine 50 milliGRAM(s) Oral at bedtime PRN insomnia  traZODone 50 milliGRAM(s) Oral at bedtime PRN Insomnia

## 2021-12-29 NOTE — BH INPATIENT PSYCHIATRY PROGRESS NOTE - NSBHASSESSSUMMFT_PSY_ALL_CORE
51M, Mongolian-speaking, originally from [facility], single, employed washing dishes at a restaurant, domiciled with four roommates including his brother, per psyckes a hx of etoh use disorders, cannabis use disorder, substance induced psychotic d/o, anxiety and major depressive disorder; one prior psychiatric hospitalization at [facility] in 2020 with diagnoses of Alcohol Induced Psychotic d/o; no known prior suicidality/aggression/legal/medical hx; bib ems, activated by pt's boss when he reported he felt lightheaded and fainted at work. Psych consult called by initial ED provider who stated pt reported CAH to kill himself. Transferred to [facility] for CAH/psychosis.     The patient continues to be disorganized, depressed, isolative, withdrawn and suicidal.     PLAN:     Psychiatric:   Risperdal 4mg qhs and Risperdal 1mg daily for psychosis.   dc Klonopin 0.5 mg PO Qhs   Trazadone 50 mg PO Qhs     PRN   Haldol 2mg PO/IM Q6H and Ativan 1mg PO/IM for agitation,   PRN Trazodone 50mg PO QHS for insomnia. P    Discontinue CO.  Reduction in symptoms

## 2021-12-30 PROCEDURE — 99232 SBSQ HOSP IP/OBS MODERATE 35: CPT

## 2021-12-30 RX ORDER — NALTREXONE HYDROCHLORIDE 50 MG/1
50 TABLET, FILM COATED ORAL DAILY
Refills: 0 | Status: DISCONTINUED | OUTPATIENT
Start: 2021-12-31 | End: 2022-01-05

## 2021-12-30 RX ADMIN — Medication 25 MILLIGRAM(S): at 08:13

## 2021-12-30 RX ADMIN — RISPERIDONE 4 MILLIGRAM(S): 4 TABLET ORAL at 20:26

## 2021-12-30 RX ADMIN — RISPERIDONE 1 MILLIGRAM(S): 4 TABLET ORAL at 08:16

## 2021-12-30 RX ADMIN — AMLODIPINE BESYLATE 5 MILLIGRAM(S): 2.5 TABLET ORAL at 08:16

## 2021-12-30 RX ADMIN — Medication 0.5 MILLIGRAM(S): at 08:13

## 2021-12-30 RX ADMIN — Medication 25 MILLIGRAM(S): at 20:26

## 2021-12-30 RX ADMIN — NALTREXONE HYDROCHLORIDE 25 MILLIGRAM(S): 50 TABLET, FILM COATED ORAL at 08:15

## 2021-12-30 RX ADMIN — Medication 50 MILLIGRAM(S): at 20:26

## 2021-12-30 NOTE — BH INPATIENT PSYCHIATRY PROGRESS NOTE - CURRENT MEDICATION
MEDICATIONS  (STANDING):  amLODIPine   Tablet 5 milliGRAM(s) Oral daily  clonazePAM Oral Disintegrating Tablet 0.5 milliGRAM(s) Oral daily  metoprolol tartrate 25 milliGRAM(s) Oral two times a day  naltrexone 25 milliGRAM(s) Oral daily  risperiDONE   Tablet 4 milliGRAM(s) Oral at bedtime  risperiDONE  Disintegrating Tablet 1 milliGRAM(s) Oral daily    MEDICATIONS  (PRN):  acetaminophen     Tablet .. 650 milliGRAM(s) Oral every 6 hours PRN Moderate Pain (4 - 6), Severe Pain (7 - 10)  diphenhydrAMINE 50 milliGRAM(s) Oral every 6 hours PRN agitation/anxiety  diphenhydrAMINE Injectable 50 milliGRAM(s) IntraMuscular once PRN agitation  diphenhydrAMINE Injectable 50 milliGRAM(s) IntraMuscular once PRN agitation  haloperidol     Tablet 5 milliGRAM(s) Oral every 6 hours PRN aggression  haloperidol    Injectable 5 milliGRAM(s) IntraMuscular once PRN agitation  haloperidol    Injectable 5 milliGRAM(s) IntraMuscular once PRN agitation  LORazepam   Injectable 2 milliGRAM(s) IntraMuscular once PRN Agitation due to psychosis  QUEtiapine 50 milliGRAM(s) Oral at bedtime PRN insomnia  traZODone 50 milliGRAM(s) Oral at bedtime PRN Insomnia   MEDICATIONS  (STANDING):  amLODIPine   Tablet 5 milliGRAM(s) Oral daily  clonazePAM Oral Disintegrating Tablet 0.5 milliGRAM(s) Oral daily  metoprolol tartrate 25 milliGRAM(s) Oral two times a day  risperiDONE   Tablet 4 milliGRAM(s) Oral at bedtime  risperiDONE  Disintegrating Tablet 1 milliGRAM(s) Oral daily    MEDICATIONS  (PRN):  acetaminophen     Tablet .. 650 milliGRAM(s) Oral every 6 hours PRN Moderate Pain (4 - 6), Severe Pain (7 - 10)  diphenhydrAMINE 50 milliGRAM(s) Oral every 6 hours PRN agitation/anxiety  diphenhydrAMINE Injectable 50 milliGRAM(s) IntraMuscular once PRN agitation  diphenhydrAMINE Injectable 50 milliGRAM(s) IntraMuscular once PRN agitation  haloperidol     Tablet 5 milliGRAM(s) Oral every 6 hours PRN aggression  haloperidol    Injectable 5 milliGRAM(s) IntraMuscular once PRN agitation  haloperidol    Injectable 5 milliGRAM(s) IntraMuscular once PRN agitation  LORazepam   Injectable 2 milliGRAM(s) IntraMuscular once PRN agitation  QUEtiapine 50 milliGRAM(s) Oral at bedtime PRN insomnia  traZODone 50 milliGRAM(s) Oral at bedtime PRN Insomnia   MEDICATIONS  (STANDING):  amLODIPine   Tablet 5 milliGRAM(s) Oral daily  clonazePAM Oral Disintegrating Tablet 0.5 milliGRAM(s) Oral daily  metoprolol tartrate 25 milliGRAM(s) Oral two times a day  naltrexone 50 milliGRAM(s) Oral daily  risperiDONE   Tablet 4 milliGRAM(s) Oral at bedtime  risperiDONE  Disintegrating Tablet 1 milliGRAM(s) Oral daily    MEDICATIONS  (PRN):  acetaminophen     Tablet .. 650 milliGRAM(s) Oral every 6 hours PRN Moderate Pain (4 - 6), Severe Pain (7 - 10)  diphenhydrAMINE 50 milliGRAM(s) Oral every 6 hours PRN agitation/anxiety  diphenhydrAMINE Injectable 50 milliGRAM(s) IntraMuscular once PRN agitation  diphenhydrAMINE Injectable 50 milliGRAM(s) IntraMuscular once PRN agitation  haloperidol     Tablet 5 milliGRAM(s) Oral every 6 hours PRN aggression  haloperidol    Injectable 5 milliGRAM(s) IntraMuscular once PRN agitation  haloperidol    Injectable 5 milliGRAM(s) IntraMuscular once PRN agitation  LORazepam   Injectable 2 milliGRAM(s) IntraMuscular once PRN agitation  QUEtiapine 50 milliGRAM(s) Oral at bedtime PRN insomnia  traZODone 50 milliGRAM(s) Oral at bedtime PRN Insomnia

## 2021-12-30 NOTE — BH INPATIENT PSYCHIATRY PROGRESS NOTE - NSBHCHARTREVIEWVS_PSY_A_CORE FT
Vital Signs Last 24 Hrs  T(C): 36.9 (12-30-21 @ 06:17), Max: 37.2 (12-29-21 @ 18:56)  T(F): 98.5 (12-30-21 @ 06:17), Max: 98.9 (12-29-21 @ 18:56)  HR: --  BP: --  BP(mean): --  RR: 18 (12-29-21 @ 21:17) (18 - 18)  SpO2: 100% (12-29-21 @ 21:17) (100% - 100%)    Orthostatic VS  12-30-21 @ 08:00  Lying BP: --/-- HR: --  Sitting BP: 137/92 HR: 81  Standing BP: 138/90 HR: 84  Site: upper left arm  Mode: electronic  Orthostatic VS  12-29-21 @ 18:56  Lying BP: --/-- HR: --  Sitting BP: 129/78 HR: 90  Standing BP: 136/76 HR: 86  Site: --  Mode: --  Orthostatic VS  12-29-21 @ 07:52  Lying BP: --/-- HR: --  Sitting BP: 121/75 HR: 74  Standing BP: 102/86 HR: 89  Site: --  Mode: --  Orthostatic VS  12-28-21 @ 18:58  Lying BP: --/-- HR: --  Sitting BP: 143/91 HR: 83  Standing BP: 135/98 HR: 88  Site: --  Mode: --   Vital Signs Last 24 Hrs  T(C): 37.2 (01-03-22 @ 06:37), Max: 37.2 (01-03-22 @ 06:37)  T(F): 98.9 (01-03-22 @ 06:37), Max: 98.9 (01-03-22 @ 06:37)  HR: --  BP: --  BP(mean): --  RR: --  SpO2: --    Orthostatic VS  01-02-22 @ 18:45  Lying BP: --/-- HR: --  Sitting BP: 126/82 HR: 73  Standing BP: 122/79 HR: 78  Site: --  Mode: --  Orthostatic VS  01-02-22 @ 07:57  Lying BP: --/-- HR: --  Sitting BP: 137/82 HR: 89  Standing BP: 139/83 HR: 72  Site: upper left arm  Mode: electronic  Orthostatic VS  01-01-22 @ 19:24  Lying BP: --/-- HR: --  Sitting BP: 150/84 HR: 79  Standing BP: 140/90 HR: 80  Site: --  Mode: --

## 2021-12-30 NOTE — BH INPATIENT PSYCHIATRY PROGRESS NOTE - NSBHASSESSSUMMFT_PSY_ALL_CORE
51M, Mongolian-speaking, originally from [facility], single, employed washing dishes at a restaurant, domiciled with four roommates including his brother, per psyckes a hx of etoh use disorders, cannabis use disorder, substance induced psychotic d/o, anxiety and major depressive disorder; one prior psychiatric hospitalization at [facility] in 2020 with diagnoses of Alcohol Induced Psychotic d/o; no known prior suicidality/aggression/legal/medical hx; bib ems, activated by pt's boss when he reported he felt lightheaded and fainted at work. Psych consult called by initial ED provider who stated pt reported CAH to kill himself. Transferred to [facility] for CAH/psychosis.     The patient continues to be disorganized, depressed, isolative, withdrawn and suicidal.     PLAN:     Psychiatric:   Risperdal 4mg qhs and Risperdal 1mg daily for psychosis.   dc Klonopin 0.5 mg PO Qhs   Trazadone 50 mg PO Qhs     PRN   Haldol 2mg PO/IM Q6H and Ativan 1mg PO/IM for agitation,   PRN Trazodone 50mg PO QHS for insomnia. P    Discontinue CO.  Reduction in symptoms

## 2021-12-30 NOTE — BH INPATIENT PSYCHIATRY PROGRESS NOTE - NSBHFUPINTERVALHXFT_PSY_A_CORE
Pt f/u for SI/psychosis. Use of  #[id number] William. Patient presents to interview in paper scrubs and fair ADLs. Patient is cooperative and calm. Patient received Seroquel PRN last night due to insomnia and anxiety. Patient reports he slept well after. No appetite concerns. Patient states "I feel okay." Patient continues to refuse EDMONDS, states "I'm better with the pill." Patient denies SI/SIB/HI/AVH. Discharge scheduled for next Wednesday (1/5). Patient states "that sounds good." Patient engages in safety planning. Pt compliant with medication, no SE reported. Continue to provide therapeutic support. Increase Naltrexone to 50 mg tomorrow.

## 2021-12-30 NOTE — BH INPATIENT PSYCHIATRY PROGRESS NOTE - PRN MEDS
MEDICATIONS  (PRN):  acetaminophen     Tablet .. 650 milliGRAM(s) Oral every 6 hours PRN Moderate Pain (4 - 6), Severe Pain (7 - 10)  diphenhydrAMINE 50 milliGRAM(s) Oral every 6 hours PRN agitation/anxiety  diphenhydrAMINE Injectable 50 milliGRAM(s) IntraMuscular once PRN agitation  diphenhydrAMINE Injectable 50 milliGRAM(s) IntraMuscular once PRN agitation  haloperidol     Tablet 5 milliGRAM(s) Oral every 6 hours PRN aggression  haloperidol    Injectable 5 milliGRAM(s) IntraMuscular once PRN agitation  haloperidol    Injectable 5 milliGRAM(s) IntraMuscular once PRN agitation  LORazepam   Injectable 2 milliGRAM(s) IntraMuscular once PRN Agitation due to psychosis  QUEtiapine 50 milliGRAM(s) Oral at bedtime PRN insomnia  traZODone 50 milliGRAM(s) Oral at bedtime PRN Insomnia   MEDICATIONS  (PRN):  acetaminophen     Tablet .. 650 milliGRAM(s) Oral every 6 hours PRN Moderate Pain (4 - 6), Severe Pain (7 - 10)  diphenhydrAMINE 50 milliGRAM(s) Oral every 6 hours PRN agitation/anxiety  diphenhydrAMINE Injectable 50 milliGRAM(s) IntraMuscular once PRN agitation  diphenhydrAMINE Injectable 50 milliGRAM(s) IntraMuscular once PRN agitation  haloperidol     Tablet 5 milliGRAM(s) Oral every 6 hours PRN aggression  haloperidol    Injectable 5 milliGRAM(s) IntraMuscular once PRN agitation  haloperidol    Injectable 5 milliGRAM(s) IntraMuscular once PRN agitation  LORazepam   Injectable 2 milliGRAM(s) IntraMuscular once PRN agitation  QUEtiapine 50 milliGRAM(s) Oral at bedtime PRN insomnia  traZODone 50 milliGRAM(s) Oral at bedtime PRN Insomnia

## 2021-12-31 LAB — SARS-COV-2 RNA SPEC QL NAA+PROBE: SIGNIFICANT CHANGE UP

## 2021-12-31 RX ADMIN — Medication 0.5 MILLIGRAM(S): at 08:56

## 2021-12-31 RX ADMIN — AMLODIPINE BESYLATE 5 MILLIGRAM(S): 2.5 TABLET ORAL at 08:57

## 2021-12-31 RX ADMIN — RISPERIDONE 4 MILLIGRAM(S): 4 TABLET ORAL at 21:14

## 2021-12-31 RX ADMIN — RISPERIDONE 1 MILLIGRAM(S): 4 TABLET ORAL at 08:56

## 2021-12-31 RX ADMIN — Medication 25 MILLIGRAM(S): at 21:13

## 2021-12-31 RX ADMIN — Medication 25 MILLIGRAM(S): at 08:56

## 2021-12-31 RX ADMIN — NALTREXONE HYDROCHLORIDE 50 MILLIGRAM(S): 50 TABLET, FILM COATED ORAL at 08:56

## 2022-01-01 RX ADMIN — Medication 0.5 MILLIGRAM(S): at 08:22

## 2022-01-01 RX ADMIN — Medication 25 MILLIGRAM(S): at 08:22

## 2022-01-01 RX ADMIN — RISPERIDONE 4 MILLIGRAM(S): 4 TABLET ORAL at 20:30

## 2022-01-01 RX ADMIN — RISPERIDONE 1 MILLIGRAM(S): 4 TABLET ORAL at 08:21

## 2022-01-01 RX ADMIN — AMLODIPINE BESYLATE 5 MILLIGRAM(S): 2.5 TABLET ORAL at 08:22

## 2022-01-01 RX ADMIN — Medication 50 MILLIGRAM(S): at 20:30

## 2022-01-01 RX ADMIN — Medication 25 MILLIGRAM(S): at 20:29

## 2022-01-01 RX ADMIN — NALTREXONE HYDROCHLORIDE 50 MILLIGRAM(S): 50 TABLET, FILM COATED ORAL at 08:22

## 2022-01-02 RX ADMIN — RISPERIDONE 4 MILLIGRAM(S): 4 TABLET ORAL at 20:21

## 2022-01-02 RX ADMIN — AMLODIPINE BESYLATE 5 MILLIGRAM(S): 2.5 TABLET ORAL at 08:22

## 2022-01-02 RX ADMIN — Medication 0.5 MILLIGRAM(S): at 08:22

## 2022-01-02 RX ADMIN — RISPERIDONE 1 MILLIGRAM(S): 4 TABLET ORAL at 08:22

## 2022-01-02 RX ADMIN — Medication 25 MILLIGRAM(S): at 08:22

## 2022-01-02 RX ADMIN — Medication 25 MILLIGRAM(S): at 20:20

## 2022-01-02 RX ADMIN — NALTREXONE HYDROCHLORIDE 50 MILLIGRAM(S): 50 TABLET, FILM COATED ORAL at 08:22

## 2022-01-03 PROCEDURE — 99232 SBSQ HOSP IP/OBS MODERATE 35: CPT

## 2022-01-03 RX ORDER — AMLODIPINE BESYLATE 2.5 MG/1
1 TABLET ORAL
Qty: 30 | Refills: 0
Start: 2022-01-03 | End: 2022-02-01

## 2022-01-03 RX ORDER — NALTREXONE HYDROCHLORIDE 50 MG/1
1 TABLET, FILM COATED ORAL
Qty: 30 | Refills: 0
Start: 2022-01-03 | End: 2022-02-01

## 2022-01-03 RX ORDER — METOPROLOL TARTRATE 50 MG
1 TABLET ORAL
Qty: 0 | Refills: 0 | DISCHARGE

## 2022-01-03 RX ORDER — RISPERIDONE 4 MG/1
2 TABLET ORAL DAILY
Refills: 0 | Status: DISCONTINUED | OUTPATIENT
Start: 2022-01-04 | End: 2022-01-05

## 2022-01-03 RX ORDER — LOSARTAN POTASSIUM 100 MG/1
1 TABLET, FILM COATED ORAL
Qty: 0 | Refills: 0 | DISCHARGE

## 2022-01-03 RX ORDER — LOSARTAN POTASSIUM 100 MG/1
1 TABLET, FILM COATED ORAL
Qty: 30 | Refills: 0
Start: 2022-01-03 | End: 2022-02-01

## 2022-01-03 RX ORDER — METOPROLOL TARTRATE 50 MG
1 TABLET ORAL
Qty: 60 | Refills: 0
Start: 2022-01-03 | End: 2022-02-01

## 2022-01-03 RX ORDER — RISPERIDONE 4 MG/1
1 TABLET ORAL
Qty: 30 | Refills: 0
Start: 2022-01-03 | End: 2022-02-01

## 2022-01-03 RX ORDER — AMLODIPINE BESYLATE 2.5 MG/1
1 TABLET ORAL
Qty: 0 | Refills: 0 | DISCHARGE

## 2022-01-03 RX ADMIN — NALTREXONE HYDROCHLORIDE 50 MILLIGRAM(S): 50 TABLET, FILM COATED ORAL at 08:32

## 2022-01-03 RX ADMIN — Medication 25 MILLIGRAM(S): at 20:51

## 2022-01-03 RX ADMIN — RISPERIDONE 1 MILLIGRAM(S): 4 TABLET ORAL at 08:32

## 2022-01-03 RX ADMIN — Medication 25 MILLIGRAM(S): at 08:31

## 2022-01-03 RX ADMIN — AMLODIPINE BESYLATE 5 MILLIGRAM(S): 2.5 TABLET ORAL at 08:32

## 2022-01-03 RX ADMIN — Medication 0.5 MILLIGRAM(S): at 08:32

## 2022-01-03 NOTE — BH INPATIENT PSYCHIATRY PROGRESS NOTE - NSBHMSESPABN_PSY_A_CORE
Soft volume/Slowed rate

## 2022-01-03 NOTE — BH INPATIENT PSYCHIATRY PROGRESS NOTE - NSBHASSESSSUMMFT_PSY_ALL_CORE
51M, Uzbek-speaking, originally from [facility], single, employed washing dishes at a restaurant, domiciled with four roommates including his brother, per psyckes a hx of etoh use disorders, cannabis use disorder, substance induced psychotic d/o, anxiety and major depressive disorder; one prior psychiatric hospitalization at [facility] in 2020 with diagnoses of Alcohol Induced Psychotic d/o; no known prior suicidality/aggression/legal/medical hx; bib ems, activated by pt's boss when he reported he felt lightheaded and fainted at work. Psych consult called by initial ED provider who stated pt reported CAH to kill himself. Transferred to [facility] for CAH/psychosis.     The patient continues to be disorganized, depressed, isolative, withdrawn and suicidal.     PLAN:     Psychiatric:   Risperdal 4mg qhs and Risperdal 1mg daily for psychosis.   dc Klonopin 0.5 mg PO Qhs   Trazadone 50 mg PO Qhs     PRN   Haldol 2mg PO/IM Q6H and Ativan 1mg PO/IM for agitation,   PRN Trazodone 50mg PO QHS for insomnia. P    Discontinue CO.  Reduction in symptoms

## 2022-01-03 NOTE — BH INPATIENT PSYCHIATRY PROGRESS NOTE - NSBHCHARTREVIEWLAB_PSY_A_CORE FT
Comprehensive Metabolic Panel (12.09.21 @ 22:00)   Sodium, Serum: 139 mmol/L   Potassium, Serum: 4.0 mmol/L   Chloride, Serum: 105 mmol/L   Carbon Dioxide, Serum: 30 mmol/L   Anion Gap, Serum: 4 mmol/L   Blood Urea Nitrogen, Serum: 28 mg/dL   Creatinine, Serum: 1.20 mg/dL   Glucose, Serum: 116 mg/dL   Calcium, Total Serum: 9.2 mg/dL   Protein Total, Serum: 7.8 g/dL   Albumin, Serum: 3.5 g/dL   Bilirubin Total, Serum: 0.4 mg/dL   Alkaline Phosphatase, Serum: 90 U/L   Aspartate Aminotransferase (AST/SGOT): 14 U/L   Alanine Aminotransferase (ALT/SGPT): 27 U/L Complete Blood Count + Automated Diff (12.09.21 @ 22:00)   WBC Count: 8.13 K/uL   RBC Count: 4.47 M/uL   Hemoglobin: 13.1 g/dL   Hematocrit: 38.7 %   Mean Cell Volume: 86.6 fl   Mean Cell Hemoglobin: 29.3 pg   Mean Cell Hemoglobin Conc: 33.9 gm/dL   Red Cell Distrib Width: 14.6 %   Platelet Count - Automated: 228 K/uL   Auto Neutrophil #: 4.64 K/uL   Auto Lymphocyte #: 2.67 K/uL   Auto Monocyte #: 0.66 K/uL   Auto Eosinophil #: 0.10 K/uL   Auto Basophil #: 0.03 K/uL   Auto Neutrophil %: 57.1: Differential percentages must be correlated with absolute numbers for   clinical significance. %   Auto Lymphocyte %: 32.8 %   Auto Monocyte %: 8.1 %   Auto Eosinophil %: 1.2 %   Auto Basophil %: 0.4 %   Auto Immature Granulocyte %: 0.4: (Includes meta, myelo and promyelocytes) %   Nucleated RBC: 0 /100 WBCs 
WNL
Comprehensive Metabolic Panel (12.09.21 @ 22:00)   Sodium, Serum: 139 mmol/L   Potassium, Serum: 4.0 mmol/L   Chloride, Serum: 105 mmol/L   Carbon Dioxide, Serum: 30 mmol/L   Anion Gap, Serum: 4 mmol/L   Blood Urea Nitrogen, Serum: 28 mg/dL   Creatinine, Serum: 1.20 mg/dL   Glucose, Serum: 116 mg/dL   Calcium, Total Serum: 9.2 mg/dL   Protein Total, Serum: 7.8 g/dL   Albumin, Serum: 3.5 g/dL   Bilirubin Total, Serum: 0.4 mg/dL   Alkaline Phosphatase, Serum: 90 U/L   Aspartate Aminotransferase (AST/SGOT): 14 U/L   Alanine Aminotransferase (ALT/SGPT): 27 U/L Complete Blood Count + Automated Diff (12.09.21 @ 22:00)   WBC Count: 8.13 K/uL   RBC Count: 4.47 M/uL   Hemoglobin: 13.1 g/dL   Hematocrit: 38.7 %   Mean Cell Volume: 86.6 fl   Mean Cell Hemoglobin: 29.3 pg   Mean Cell Hemoglobin Conc: 33.9 gm/dL   Red Cell Distrib Width: 14.6 %   Platelet Count - Automated: 228 K/uL   Auto Neutrophil #: 4.64 K/uL   Auto Lymphocyte #: 2.67 K/uL   Auto Monocyte #: 0.66 K/uL   Auto Eosinophil #: 0.10 K/uL   Auto Basophil #: 0.03 K/uL   Auto Neutrophil %: 57.1: Differential percentages must be correlated with absolute numbers for   clinical significance. %   Auto Lymphocyte %: 32.8 %   Auto Monocyte %: 8.1 %   Auto Eosinophil %: 1.2 %   Auto Basophil %: 0.4 %   Auto Immature Granulocyte %: 0.4: (Includes meta, myelo and promyelocytes) %   Nucleated RBC: 0 /100 WBCs 
WNL
WNL

## 2022-01-03 NOTE — BH INPATIENT PSYCHIATRY PROGRESS NOTE - NSCGISEVERILLNESS_PSY_ALL_CORE
5 = Markedly ill - intrusive symptoms that distinctly impair social/occupational function or cause intrusive levels of distress
7 = Among the most extremely ill patients – pathology drastically interferes in many life functions; may be hospitalized
5 = Markedly ill - intrusive symptoms that distinctly impair social/occupational function or cause intrusive levels of distress
7 = Among the most extremely ill patients – pathology drastically interferes in many life functions; may be hospitalized
7 = Among the most extremely ill patients – pathology drastically interferes in many life functions; may be hospitalized
5 = Markedly ill - intrusive symptoms that distinctly impair social/occupational function or cause intrusive levels of distress
7 = Among the most extremely ill patients – pathology drastically interferes in many life functions; may be hospitalized

## 2022-01-03 NOTE — BH INPATIENT PSYCHIATRY PROGRESS NOTE - NSBHASSESSSUMMFT_PSY_ALL_CORE
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 Psych consult called by initial ED provider who stated pt reported CAH to kill himself. Transferred to [facility] for CAH/psychosis.     The patient continues to be disorganized, depressed, isolative, withdrawn and suicidal.     PLAN:     Psychiatric:   Risperdal 4mg qhs and Risperdal 1mg daily for psychosis.   dc Klonopin 0.5 mg PO Qhs   Trazadone 50 mg PO Qhs     PRN   Haldol 2mg PO/IM Q6H and Ativan 1mg PO/IM for agitation,   PRN Trazodone 50mg PO QHS for insomnia. P    Discontinue CO.  Reduction in symptoms

## 2022-01-03 NOTE — BH INPATIENT PSYCHIATRY PROGRESS NOTE - NSBHFUPINTERVALHXFT_PSY_A_CORE
Patient f/u for SI/psychosis. Use of  #[id number] Breanna. Patient presents to interview in paper scrubs and fair ADLs. Patient is calm and cooperative. No interval events reported. Patient reports good sleep and appetite. Patient states "I feel good." Discharge scheduled for this Wednesday (1/5). Patient's brother will pick him up. Patient states "I'm grateful for your assistance." Discussed with patient the importance of medication compliance after discharge. Patient understands and agrees. Patient identifies a good support system. Patient engages in safety planning. Pt offers no complaints. Patient denies SI/SIB/HI/AVH. Pt compliant with medications. Continue to provide therapeutic support. C/w current medication regimen, no SE reported.  Patient f/u for SI/psychosis. Use of  #[id number] Breanna.   Patient presents to interview in paper scrubs and fair ADLs. Patient is calm and cooperative. No interval events reported. Patient reports good sleep and appetite. Patient states "I feel good." Discharge scheduled for this Wednesday (1/5). Patient's brother will pick him up. Patient states "I'm grateful for your assistance." Discussed with patient the importance of medication compliance after discharge. Patient understands and agrees. Patient identifies a good support system. Patient engages in safety planning. Pt offers no complaints. Patient denies SI/SIB/HI/AVH. Pt compliant with medications. Continue to provide therapeutic support. C/w current medication regimen, no SE reported.

## 2022-01-03 NOTE — BH INPATIENT PSYCHIATRY PROGRESS NOTE - PRN MEDS
MEDICATIONS  (PRN):  acetaminophen     Tablet .. 650 milliGRAM(s) Oral every 6 hours PRN Moderate Pain (4 - 6), Severe Pain (7 - 10)  diphenhydrAMINE 50 milliGRAM(s) Oral every 6 hours PRN agitation/anxiety  diphenhydrAMINE Injectable 50 milliGRAM(s) IntraMuscular once PRN agitation  diphenhydrAMINE Injectable 50 milliGRAM(s) IntraMuscular once PRN agitation  haloperidol     Tablet 5 milliGRAM(s) Oral every 6 hours PRN aggression  haloperidol    Injectable 5 milliGRAM(s) IntraMuscular once PRN agitation  haloperidol    Injectable 5 milliGRAM(s) IntraMuscular once PRN agitation  LORazepam   Injectable 2 milliGRAM(s) IntraMuscular once PRN agitation  QUEtiapine 50 milliGRAM(s) Oral at bedtime PRN insomnia  traZODone 50 milliGRAM(s) Oral at bedtime PRN Insomnia

## 2022-01-03 NOTE — BH INPATIENT PSYCHIATRY PROGRESS NOTE - NSBHCHARTREVIEWVS_PSY_A_CORE FT
Vital Signs Last 24 Hrs  T(C): 37.2 (01-03-22 @ 06:37), Max: 37.2 (01-03-22 @ 06:37)  T(F): 98.9 (01-03-22 @ 06:37), Max: 98.9 (01-03-22 @ 06:37)  HR: --  BP: --  BP(mean): --  RR: --  SpO2: --    Orthostatic VS  01-02-22 @ 18:45  Lying BP: --/-- HR: --  Sitting BP: 126/82 HR: 73  Standing BP: 122/79 HR: 78  Site: --  Mode: --  Orthostatic VS  01-02-22 @ 07:57  Lying BP: --/-- HR: --  Sitting BP: 137/82 HR: 89  Standing BP: 139/83 HR: 72  Site: upper left arm  Mode: electronic  Orthostatic VS  01-01-22 @ 19:24  Lying BP: --/-- HR: --  Sitting BP: 150/84 HR: 79  Standing BP: 140/90 HR: 80  Site: --  Mode: --

## 2022-01-03 NOTE — BH INPATIENT PSYCHIATRY PROGRESS NOTE - CURRENT MEDICATION
MEDICATIONS  (STANDING):  amLODIPine   Tablet 5 milliGRAM(s) Oral daily  clonazePAM Oral Disintegrating Tablet 0.5 milliGRAM(s) Oral daily  metoprolol tartrate 25 milliGRAM(s) Oral two times a day  naltrexone 50 milliGRAM(s) Oral daily  risperiDONE   Tablet 4 milliGRAM(s) Oral at bedtime  risperiDONE  Disintegrating Tablet 1 milliGRAM(s) Oral daily    MEDICATIONS  (PRN):  acetaminophen     Tablet .. 650 milliGRAM(s) Oral every 6 hours PRN Moderate Pain (4 - 6), Severe Pain (7 - 10)  diphenhydrAMINE 50 milliGRAM(s) Oral every 6 hours PRN agitation/anxiety  diphenhydrAMINE Injectable 50 milliGRAM(s) IntraMuscular once PRN agitation  diphenhydrAMINE Injectable 50 milliGRAM(s) IntraMuscular once PRN agitation  haloperidol     Tablet 5 milliGRAM(s) Oral every 6 hours PRN aggression  haloperidol    Injectable 5 milliGRAM(s) IntraMuscular once PRN agitation  haloperidol    Injectable 5 milliGRAM(s) IntraMuscular once PRN agitation  LORazepam   Injectable 2 milliGRAM(s) IntraMuscular once PRN agitation  QUEtiapine 50 milliGRAM(s) Oral at bedtime PRN insomnia  traZODone 50 milliGRAM(s) Oral at bedtime PRN Insomnia

## 2022-01-04 LAB — SARS-COV-2 RNA SPEC QL NAA+PROBE: SIGNIFICANT CHANGE UP

## 2022-01-04 PROCEDURE — 99232 SBSQ HOSP IP/OBS MODERATE 35: CPT

## 2022-01-04 RX ADMIN — Medication 25 MILLIGRAM(S): at 08:18

## 2022-01-04 RX ADMIN — Medication 650 MILLIGRAM(S): at 21:43

## 2022-01-04 RX ADMIN — Medication 650 MILLIGRAM(S): at 20:43

## 2022-01-04 RX ADMIN — RISPERIDONE 4 MILLIGRAM(S): 4 TABLET ORAL at 20:42

## 2022-01-04 RX ADMIN — RISPERIDONE 2 MILLIGRAM(S): 4 TABLET ORAL at 08:18

## 2022-01-04 RX ADMIN — NALTREXONE HYDROCHLORIDE 50 MILLIGRAM(S): 50 TABLET, FILM COATED ORAL at 08:18

## 2022-01-04 RX ADMIN — Medication 25 MILLIGRAM(S): at 20:42

## 2022-01-04 RX ADMIN — AMLODIPINE BESYLATE 5 MILLIGRAM(S): 2.5 TABLET ORAL at 08:19

## 2022-01-04 NOTE — BH INPATIENT PSYCHIATRY PROGRESS NOTE - NSBHCHARTREVIEWVS_PSY_A_CORE FT
Vital Signs Last 24 Hrs  T(C): 36.2 (01-04-22 @ 06:57), Max: 37 (01-03-22 @ 19:52)  T(F): 97.2 (01-04-22 @ 06:57), Max: 98.6 (01-03-22 @ 19:52)  HR: --  BP: --  BP(mean): --  RR: --  SpO2: --    Orthostatic VS  01-04-22 @ 06:57  Lying BP: --/-- HR: --  Sitting BP: 139/88 HR: 70  Standing BP: --/-- HR: --  Site: upper left arm  Mode: electronic  Orthostatic VS  01-03-22 @ 19:52  Lying BP: --/-- HR: --  Sitting BP: 139/84 HR: 74  Standing BP: 132/86 HR: 79  Site: --  Mode: --  Orthostatic VS  01-02-22 @ 18:45  Lying BP: --/-- HR: --  Sitting BP: 126/82 HR: 73  Standing BP: 122/79 HR: 78  Site: --  Mode: --

## 2022-01-04 NOTE — BH INPATIENT PSYCHIATRY PROGRESS NOTE - NSTXSUICIDGOALOTHER_PSY_ALL_CORE
Refrain from engaging in SIB for seven consecutive days.

## 2022-01-04 NOTE — BH INPATIENT PSYCHIATRY PROGRESS NOTE - NSBHFUPINTERVALHXFT_PSY_A_CORE
Pt interviewed with  ID #[id number]. No acute events overnight. Pt compliant with meds, aside from refusing Risperdal last night (though compliant this morning). States he believed Risperdal given last night was to "prevent fainting" though upon clarification is agreeable to take it. States he feels much better and wants to get back to work. Feels well supported by family. Denies AH/CAH and other psychotic symptoms. Pt in good behavioral control, keeping to himself.

## 2022-01-04 NOTE — BH INPATIENT PSYCHIATRY PROGRESS NOTE - NSBHASSESSSUMMFT_PSY_ALL_CORE
51M, Upper sorbian-speaking, originally from [facility], single, employed washing dishes at a restaurant, domiciled with four roommates including his brother, per psyckes a hx of etoh use disorders, cannabis use disorder, substance induced psychotic d/o, anxiety and major depressive disorder; one prior psychiatric hospitalization at [facility] in 2020 with diagnoses of Alcohol Induced Psychotic d/o; no known prior suicidality/aggression/legal/medical hx; bib ems, activated by pt's boss when he reported he felt lightheaded and fainted at work. Psych consult called by initial ED provider who stated pt reported CAH to kill himself. Transferred to [facility] for CAH/psychosis.     Pt remains with improved mood and resolution of AH/CAH. No other psychotic symptoms elicited.   Plan: C/w Risperdal. Dispo planning underway.

## 2022-01-04 NOTE — BH INPATIENT PSYCHIATRY PROGRESS NOTE - CURRENT MEDICATION
MEDICATIONS  (STANDING):  amLODIPine   Tablet 5 milliGRAM(s) Oral daily  metoprolol tartrate 25 milliGRAM(s) Oral two times a day  naltrexone 50 milliGRAM(s) Oral daily  risperiDONE   Tablet 2 milliGRAM(s) Oral daily  risperiDONE   Tablet 4 milliGRAM(s) Oral at bedtime    MEDICATIONS  (PRN):  acetaminophen     Tablet .. 650 milliGRAM(s) Oral every 6 hours PRN Moderate Pain (4 - 6), Severe Pain (7 - 10)  diphenhydrAMINE 50 milliGRAM(s) Oral every 6 hours PRN agitation/anxiety  diphenhydrAMINE Injectable 50 milliGRAM(s) IntraMuscular once PRN agitation  diphenhydrAMINE Injectable 50 milliGRAM(s) IntraMuscular once PRN agitation  haloperidol     Tablet 5 milliGRAM(s) Oral every 6 hours PRN aggression  haloperidol    Injectable 5 milliGRAM(s) IntraMuscular once PRN agitation  haloperidol    Injectable 5 milliGRAM(s) IntraMuscular once PRN agitation  LORazepam   Injectable 2 milliGRAM(s) IntraMuscular once PRN agitation  QUEtiapine 50 milliGRAM(s) Oral at bedtime PRN insomnia  traZODone 50 milliGRAM(s) Oral at bedtime PRN Insomnia   MEDICATIONS  (STANDING):  amLODIPine   Tablet 5 milliGRAM(s) Oral daily  metoprolol tartrate 25 milliGRAM(s) Oral two times a day  naltrexone 50 milliGRAM(s) Oral daily  risperiDONE   Tablet 4 milliGRAM(s) Oral at bedtime  risperiDONE   Tablet 2 milliGRAM(s) Oral daily    MEDICATIONS  (PRN):  acetaminophen     Tablet .. 650 milliGRAM(s) Oral every 6 hours PRN Moderate Pain (4 - 6), Severe Pain (7 - 10)  diphenhydrAMINE 50 milliGRAM(s) Oral every 6 hours PRN agitation/anxiety  diphenhydrAMINE Injectable 50 milliGRAM(s) IntraMuscular once PRN agitation  diphenhydrAMINE Injectable 50 milliGRAM(s) IntraMuscular once PRN agitation  haloperidol     Tablet 5 milliGRAM(s) Oral every 6 hours PRN aggression  haloperidol    Injectable 5 milliGRAM(s) IntraMuscular once PRN agitation  haloperidol    Injectable 5 milliGRAM(s) IntraMuscular once PRN agitation  LORazepam   Injectable 2 milliGRAM(s) IntraMuscular once PRN agitation  QUEtiapine 50 milliGRAM(s) Oral at bedtime PRN insomnia  traZODone 50 milliGRAM(s) Oral at bedtime PRN Insomnia

## 2022-01-04 NOTE — BH INPATIENT PSYCHIATRY PROGRESS NOTE - NSBHMETABOLICLABS_PSY_ALL_CORE
All labs not within last 12 months, ordered
All labs not within last 12 months, not ordered
Labs within last 12 months
All labs not within last 12 months, not ordered
Labs within last 12 months
All labs not within last 12 months, not ordered
Labs within last 12 months
All labs not within last 12 months, not ordered
Labs within last 12 months

## 2022-01-05 VITALS — TEMPERATURE: 98 F

## 2022-01-05 PROCEDURE — 99239 HOSP IP/OBS DSCHRG MGMT >30: CPT

## 2022-01-05 RX ADMIN — AMLODIPINE BESYLATE 5 MILLIGRAM(S): 2.5 TABLET ORAL at 08:40

## 2022-01-05 RX ADMIN — Medication 25 MILLIGRAM(S): at 08:40

## 2022-01-05 RX ADMIN — RISPERIDONE 2 MILLIGRAM(S): 4 TABLET ORAL at 08:40

## 2022-01-05 RX ADMIN — NALTREXONE HYDROCHLORIDE 50 MILLIGRAM(S): 50 TABLET, FILM COATED ORAL at 08:40

## 2022-01-05 NOTE — BH INPATIENT PSYCHIATRY PROGRESS NOTE - NSTXSUICIDDATETRGT_PSY_ALL_CORE
05-Jan-2022
31-Dec-2021
29-Dec-2021
05-Jan-2022
24-Dec-2021
05-Jan-2022
24-Dec-2021
24-Dec-2021
31-Dec-2021
24-Dec-2021
11-Jan-2022
29-Dec-2021

## 2022-01-05 NOTE — BH INPATIENT PSYCHIATRY PROGRESS NOTE - NSTXSUICIDGOAL_PSY_ALL_CORE
Other...
Will develop a suicide prevention/safety plan
Will verbalize a decrease in preoccupation with suicidal thoughts and / or intent to commit suicide to 2 on a 10-point scale
Other...
Will develop a suicide prevention/safety plan
Will verbalize a decrease in preoccupation with suicidal thoughts and / or intent to commit suicide to 2 on a 10-point scale
Other...
Will identify and utilize 2 coping skills
Other...

## 2022-01-05 NOTE — BH INPATIENT PSYCHIATRY PROGRESS NOTE - NSTXPSYCHOGOAL_PSY_ALL_CORE
Will identify 2 coping skills that help mitigate hallucinations
Will identify 1 trigger/stressor that exacerbates hallucinations
Will identify 1 trigger/stressor that exacerbates hallucinations
Will identify 2 coping skills that help mitigate hallucinations
Will be able to report experiencing hallucinations to staff
Will identify 1 trigger/stressor that exacerbates hallucinations
Will identify 1 trigger/stressor that exacerbates hallucinations
Will identify 2 coping skills that help mitigate hallucinations
Will be able to report experiencing hallucinations to staff
Will identify 2 coping skills that help mitigate hallucinations
Will identify 1 trigger/stressor that exacerbates hallucinations
Will identify 2 coping skills that help mitigate hallucinations
Will identify 1 trigger/stressor that exacerbates hallucinations
Will identify 2 coping skills that help mitigate hallucinations
Will identify 1 trigger/stressor that exacerbates hallucinations
Will identify 2 coping skills that help mitigate hallucinations
Will identify 1 trigger/stressor that exacerbates hallucinations
Will identify 1 trigger/stressor that exacerbates hallucinations
Will be able to report experiencing hallucinations to staff

## 2022-01-05 NOTE — BH INPATIENT PSYCHIATRY PROGRESS NOTE - NSBHROSSYSTEMS_PSY_ALL_CORE
Psychiatric

## 2022-01-05 NOTE — BH INPATIENT PSYCHIATRY PROGRESS NOTE - NSTXDCOPLKDATETRGT_PSY_ALL_CORE
29-Dec-2021
27-Dec-2021
27-Dec-2021
05-Jan-2022
27-Dec-2021
29-Dec-2021
27-Dec-2021
29-Dec-2021
27-Dec-2021
06-Jan-2022
05-Jan-2022
29-Dec-2021
27-Dec-2021
29-Dec-2021
06-Jan-2022
29-Dec-2021

## 2022-01-05 NOTE — BH INPATIENT PSYCHIATRY PROGRESS NOTE - NSBHCHARTREVIEWVS_PSY_A_CORE FT
Vital Signs Last 24 Hrs  T(C): 36.4 (01-05-22 @ 07:57), Max: 36.8 (01-04-22 @ 20:24)  T(F): 97.5 (01-05-22 @ 07:57), Max: 98.3 (01-04-22 @ 20:24)  HR: --  BP: --  BP(mean): --  RR: --  SpO2: --    Orthostatic VS  01-05-22 @ 07:57  Lying BP: --/-- HR: --  Sitting BP: 133/89 HR: 80  Standing BP: --/-- HR: --  Site: --  Mode: --  Orthostatic VS  01-04-22 @ 20:24  Lying BP: --/-- HR: --  Sitting BP: 135/89 HR: 77  Standing BP: --/-- HR: --  Site: --  Mode: --  Orthostatic VS  01-04-22 @ 06:57  Lying BP: --/-- HR: --  Sitting BP: 139/88 HR: 70  Standing BP: --/-- HR: --  Site: upper left arm  Mode: electronic  Orthostatic VS  01-03-22 @ 19:52  Lying BP: --/-- HR: --  Sitting BP: 139/84 HR: 74  Standing BP: 132/86 HR: 79  Site: --  Mode: --

## 2022-01-05 NOTE — BH INPATIENT PSYCHIATRY PROGRESS NOTE - CURRENT MEDICATION
MEDICATIONS  (STANDING):  amLODIPine   Tablet 5 milliGRAM(s) Oral daily  metoprolol tartrate 25 milliGRAM(s) Oral two times a day  naltrexone 50 milliGRAM(s) Oral daily  risperiDONE   Tablet 2 milliGRAM(s) Oral daily  risperiDONE   Tablet 4 milliGRAM(s) Oral at bedtime    MEDICATIONS  (PRN):  acetaminophen     Tablet .. 650 milliGRAM(s) Oral every 6 hours PRN Moderate Pain (4 - 6), Severe Pain (7 - 10)  diphenhydrAMINE 50 milliGRAM(s) Oral every 6 hours PRN agitation/anxiety  diphenhydrAMINE Injectable 50 milliGRAM(s) IntraMuscular once PRN agitation  diphenhydrAMINE Injectable 50 milliGRAM(s) IntraMuscular once PRN agitation  haloperidol     Tablet 5 milliGRAM(s) Oral every 6 hours PRN aggression  haloperidol    Injectable 5 milliGRAM(s) IntraMuscular once PRN agitation  haloperidol    Injectable 5 milliGRAM(s) IntraMuscular once PRN agitation  LORazepam   Injectable 2 milliGRAM(s) IntraMuscular once PRN agitation  QUEtiapine 50 milliGRAM(s) Oral at bedtime PRN insomnia  traZODone 50 milliGRAM(s) Oral at bedtime PRN Insomnia

## 2022-01-05 NOTE — BH INPATIENT PSYCHIATRY PROGRESS NOTE - NSTXCOMMDATETRGT_PSY_ALL_CORE
05-Jan-2022
27-Dec-2021
27-Dec-2021
05-Jan-2022
05-Jan-2022
17-Dec-2021
27-Dec-2021
17-Dec-2021
17-Dec-2021
27-Dec-2021
05-Jan-2022
27-Dec-2021
05-Jan-2022
05-Jan-2022
27-Dec-2021

## 2022-01-05 NOTE — BH INPATIENT PSYCHIATRY PROGRESS NOTE - NSTXSUICIDINTERMD_PSY_ALL_CORE
meds and therapy 

## 2022-01-05 NOTE — BH INPATIENT PSYCHIATRY PROGRESS NOTE - NSTXCOMMPROGRES_PSY_ALL_CORE
No Change
Improving
No Change
Improving
No Change
Improving
No Change
Improving
No Change
Improving
Improving
No Change

## 2022-01-05 NOTE — BH INPATIENT PSYCHIATRY PROGRESS NOTE - NSBHINPTBILLING_PSY_ALL_CORE
39210 - Inpatient Moderate Complexity
27656 - Inpatient Moderate Complexity
60869 - Inpatient Moderate Complexity
60987 - Inpatient Moderate Complexity
92546 - Inpatient Moderate Complexity
24113 - Inpatient Moderate Complexity
66682 - Inpatient Moderate Complexity
71471 - Inpatient Moderate Complexity
88980 - Hospital Discharge Day Management; more than 30 min
31271 - Inpatient Low Complexity
50408 - Inpatient Moderate Complexity
03947 - Inpatient Moderate Complexity
78278 - Inpatient Moderate Complexity
57034 - Inpatient Moderate Complexity
73226 - Inpatient Moderate Complexity
38294 - Inpatient Moderate Complexity
23472 - Inpatient Moderate Complexity
78869 - Inpatient Moderate Complexity
81963 - Inpatient Moderate Complexity
92358 - Inpatient Moderate Complexity
96515 - Inpatient Moderate Complexity
73109 - Inpatient Moderate Complexity
66004 - Inpatient Moderate Complexity
06718 - Inpatient Moderate Complexity

## 2022-01-05 NOTE — BH INPATIENT PSYCHIATRY PROGRESS NOTE - NSTXPSYCHODATENEW_PSY_ALL_CORE
24-Dec-2021

## 2022-01-05 NOTE — BH DISCHARGE NOTE NURSING/SOCIAL WORK/PSYCH REHAB - DISCHARGE INSTRUCTIONS AFTERCARE APPOINTMENTS
In order to check the location, date, or time of your aftercare appointment, please refer to your Discharge Instructions Document given to you upon leaving the hospital.  If you have lost the instructions please call [phone]

## 2022-01-05 NOTE — BH INPATIENT PSYCHIATRY PROGRESS NOTE - NSTXPSYCHODATEEST_PSY_ALL_CORE
11-Dec-2021
10-Dec-2021
11-Dec-2021
10-Dec-2021
10-Dec-2021
04-Jan-2022
10-Dec-2021
11-Dec-2021
10-Dec-2021
10-Dec-2021
11-Dec-2021
10-Dec-2021
11-Dec-2021
04-Jan-2022
10-Dec-2021

## 2022-01-05 NOTE — BH INPATIENT PSYCHIATRY PROGRESS NOTE - NSTXPSYCHOPROGRES_PSY_ALL_CORE
No Change
Improving
No Change
Met - goal discontinued
No Change
Met - goal discontinued
Improving
No Change
Improving
No Change
No Change
Met - goal discontinued
Improving
No Change
Improving

## 2022-01-05 NOTE — BH INPATIENT PSYCHIATRY PROGRESS NOTE - NSBHATTESTSEENBY_PSY_A_CORE
NP without Attending Psychiatrist
attending Psychiatrist without NP/Trainee
attending Psychiatrist without NP/Trainee
NP without Attending Psychiatrist
attending Psychiatrist without NP/Trainee
NP without Attending Psychiatrist
attending Psychiatrist without NP/Trainee
Attending Psychiatrist supervising NP/Trainee, meeting pt...
Attending Psychiatrist supervising NP/Trainee, meeting pt...
NP without Attending Psychiatrist
NP without Attending Psychiatrist
attending Psychiatrist without NP/Trainee

## 2022-01-05 NOTE — BH INPATIENT PSYCHIATRY PROGRESS NOTE - NSTXDCOPLKDATEEST_PSY_ALL_CORE
22-Dec-2021
13-Dec-2021
03-Jan-2022
13-Dec-2021
22-Dec-2021
13-Dec-2021
22-Dec-2021
22-Dec-2021
30-Dec-2021
13-Dec-2021
22-Dec-2021
22-Dec-2021
13-Dec-2021
13-Dec-2021
03-Jan-2022
13-Dec-2021
22-Dec-2021
13-Dec-2021
13-Dec-2021
22-Dec-2021
30-Dec-2021

## 2022-01-05 NOTE — BH INPATIENT PSYCHIATRY PROGRESS NOTE - NSTXDCOPLKGOAL_PSY_ALL_CORE
Will agree to consider an appropriate level of outpatient care

## 2022-01-05 NOTE — BH DISCHARGE NOTE NURSING/SOCIAL WORK/PSYCH REHAB - NSDCADDINFO1FT_PSY_ALL_CORE
***An Advanced Directive is a type of written or verbal instruction about health care to be followed if a person becomes unable to make decisions regarding his or her medical or psychiatric treatment. A surrogate decision maker, also known as a health care proxy/agent, is a person who may act as a decision maker for an incapable person.  Patient was provided with information and declined to complete an Advance Directive/identify a Surrogate Decision Maker.

## 2022-01-05 NOTE — BH INPATIENT PSYCHIATRY PROGRESS NOTE - NSBHFUPINTERVALCCFT_PSY_A_CORE
f/u psychosis and depression 
"I fainted"
SI/psychosis  
Patient followed up for SI/psychosis.   
Patient followed up for SI/psychosis.   
Psychosis/disorganization
"I have to be at work, I need to leave"
Psychosis/disorganization  Remains on CO
F/u for SI/psychosis 
"I have to be at work, I need to leave"
SI/psychosis  
SI/psychosis  Renew CO
f/u depression and AH
SI/psychosis  
"I have to be at work, I need to leave"
Patient followed up for SI/psychosis. Patient states" OK"   
Psychosis/disorganization  Remains on CO
SI/psychosis  Renew CO
SI/psychosis  
SI/psychosis  
Patient followed up for SI/psychosis.   
Patient followed up for SI/psychosis.   pt is working on disposition issues with dc on 1/5/22

## 2022-01-05 NOTE — BH DISCHARGE NOTE NURSING/SOCIAL WORK/PSYCH REHAB - NSDCPRGOAL_PSY_ALL_CORE
Writer met with patient to safety plan for discharge and discuss the patient’s progress throughout the inpatient stay. Patient was receptive to safety planning and readily identified coping skills, triggers, social support, and reasons for living. Writer utilized  phone to meet with pt as he requested and it provided for a more in-depth and meaningful interaction. 's name was Minerva. Pt was admitted in the context of psychosis and CAH from the devil telling him to kill himself and was observed as disorganized and tense. Throughout current hospitalization, pt was transferred from 4 to 3 towards the end of his stay. On ML4, it is noted that pt was able to reach several psychiatric rehabilitation goals throughout current stay. Pt no longer endorses CAH or psychotic symptoms and has not engaged in any SIB since goal was met. Upon discharge, pt reports that he has "no urge" to injure self. During hospitalization, pt was intermittently visible on the unit and engaged in some psych rehab initiatives, however kept mostly to self. Pt was social when engaged by staff or his peers. Pt was calm, cooperative, and polite. Upon discharge, pt reports feelings of readiness to discharge back home and endorses that the people whom he lives with are helpful in the home and to him in his daily life. Pt was able to identify that life is "worth living" and that "living life is important." He continued to repeat the "importance" of "living life." Upon discharge, pt denies SI/HI/I/P and psychotic symptomology. Pt's ADL's are well maintained and pt remained in good behavioral control.

## 2022-01-05 NOTE — BH INPATIENT PSYCHIATRY PROGRESS NOTE - NSTXCOMMDATEEST_PSY_ALL_CORE
10-Dec-2021

## 2022-01-05 NOTE — BH INPATIENT PSYCHIATRY PROGRESS NOTE - NSTXSUICIDPROGRES_PSY_ALL_CORE
Met - goal discontinued
Improving
Met - goal discontinued
Improving
Met - goal discontinued
Met - goal discontinued

## 2022-01-05 NOTE — BH DISCHARGE NOTE NURSING/SOCIAL WORK/PSYCH REHAB - PATIENT PORTAL LINK FT
You can access the FollowMyHealth Patient Portal offered by [facility] by registering at the following website: [email or web address]. By joining [employer]’s FollowMyHealth portal, you will also be able to view your health information using other applications (apps) compatible with our system.

## 2022-01-05 NOTE — BH INPATIENT PSYCHIATRY PROGRESS NOTE - NSTXCOMMGOAL_PSY_ALL_CORE
Will utilize  services or communication board to facilitate communication

## 2022-01-05 NOTE — BH DISCHARGE NOTE NURSING/SOCIAL WORK/PSYCH REHAB - NSBHDCAGENCY1FT_PSY_A_CORE
[facility]  This appointment is an "in person appointment". Please arrive 10 to 15 minutes prior to your appointment time.

## 2022-01-05 NOTE — BH INPATIENT PSYCHIATRY PROGRESS NOTE - NSBHCONTPROVIDER_PSY_ALL_CORE
Not applicable

## 2022-01-05 NOTE — BH INPATIENT PSYCHIATRY PROGRESS NOTE - NSICDXBHSECONDARYDX_PSY_ALL_CORE
Alcohol abuse   F10.10  Cannabis abuse   F12.10  

## 2022-01-05 NOTE — BH DISCHARGE NOTE NURSING/SOCIAL WORK/PSYCH REHAB - NSDCPRRECOMMEND_PSY_ALL_CORE
Psychiatric Rehabilitation staff recommends that the patient engage in outpatient services for continued medication and symptom management. Upon discharge, patient has an appointment scheduled with [facility].    In Response to the covid-19 outbreak there has been a shift in hospital wide policies and protocols. As a result, it should be noted unit activities and structure have been temporarily modified to promote safety of pt. and staff.

## 2022-01-05 NOTE — BH INPATIENT PSYCHIATRY PROGRESS NOTE - NSTXDCOPLKPROGRES_PSY_ALL_CORE
Improving
Met - goal discontinued
Improving

## 2022-01-05 NOTE — BH INPATIENT PSYCHIATRY PROGRESS NOTE - NSBHASSESSSUMMFT_PSY_ALL_CORE
51M, Kiswahili-speaking, originally from [facility], single, employed washing dishes at a restaurant, domiciled with four roommates including his brother, per psyckes a hx of etoh use disorders, cannabis use disorder, substance induced psychotic d/o, anxiety and major depressive disorder; one prior psychiatric hospitalization at [facility] in 2020 with diagnoses of Alcohol Induced Psychotic d/o; no known prior suicidality/aggression/legal/medical hx; bib ems, activated by pt's boss when he reported he felt lightheaded and fainted at work. Psych consult called by initial ED provider who stated pt reported CAH to kill himself. Transferred to [facility] for CAH/psychosis.     Pt remains with improved mood and resolution of AH/CAH. No other psychotic symptoms elicited. Pt to be discharged home today.     Pt’s psychotic and depressive symptoms have improved considerably over the course of hospitalization. The patient continues to report absence of thoughts of self-harm, suicide, aggression, or homicide. He is compliant with medication and is in appropriate behavioral control. Although some residual symptoms remain, these are not impacting behavior and are not distressing. Given the above, patient does not appear to constitute an imminent threat to self or others and is appropriate for discharge.  Patient instructed to contact outpt providers, call 911, or go to nearest emergency room with any thoughts of self-harm, suicide, aggression, or homicide. He expressed understanding of these emergency procedures and is in agreement with plan.

## 2022-01-05 NOTE — BH INPATIENT PSYCHIATRY PROGRESS NOTE - NSBHFUPINTERVALHXFT_PSY_A_CORE
Pt interviewed with  ID #[id number]. No acute events overnight. Pt remains in good behavioral control, compliant with meds, and slept well. Continues to report improved mood and states he is feeling much better. Reports good energy and appetite. Pt hopeful about the future and looking forward to getting back to work. Feels well supported by family. Denies AH/CAH and delusions of surveillance (as well as other psychotic symptoms). Reports feeling safe returning home and states intent to reach out for help should he start to feel unsafe again. Denies HIIP. Understands importance of continuing to take his meds.

## 2022-01-05 NOTE — BH INPATIENT PSYCHIATRY PROGRESS NOTE - NSDCCRITERIA_PSY_ALL_CORE
Reduction in symptoms  CGI<=3
Reduction in symptoms  CGI<=3
reduction in symptoms
Reduction in symptoms
Reduction in symptoms  CGI<=3
Reduction in symptoms
Reduction in symptoms
Reduction in symptoms  CGI<=3

## 2022-01-05 NOTE — BH INPATIENT PSYCHIATRY PROGRESS NOTE - NSBHANTIPSYCHOTIC_PSY_ALL_CORE
Yes...
No
Yes...

## 2022-01-05 NOTE — BH SAFETY PLAN - THE ONE THING THAT IS MOST IMPORTANT TO ME AND WORTH LIVING FOR IS:
"Yes, it is worth living. Everything is in your head.. Life is very much so worth living for. It is very important. It is important for everyone."

## 2022-01-05 NOTE — BH INPATIENT PSYCHIATRY PROGRESS NOTE - NSTXPROBDCOPLK_PSY_ALL_CORE
DISCHARGE ISSUE - LACK OF APPROPRIATE OUTPATIENT SERVICES

## 2022-01-05 NOTE — BH INPATIENT PSYCHIATRY PROGRESS NOTE - NSTXPSYCHOINTERMD_PSY_ALL_CORE
meds and therapy

## 2022-01-05 NOTE — BH DISCHARGE NOTE NURSING/SOCIAL WORK/PSYCH REHAB - NSCDUDCCRISIS_PSY_A_CORE
[facility]  [phone] [facility]-WELL ([phone])  Text "WELL" to [phone]  Website: www.[employer]/.[employer] [facility] 1 (587) 151-JKXY (8879) Website: www.safehorizon.org/.National Suicide Prevention Lifeline 4 (727) 079-4588/.  [employer]  1 (461) LIF[employer] (813-8641)/.  [facility]  [address] [place] [address] [phone]   Hours:  Monday through Friday from 9 AM to 3 PM/.  [employer] - [facility] Crisis Line  [phone], Option 1

## 2022-01-05 NOTE — BH INPATIENT PSYCHIATRY PROGRESS NOTE - NSTXPROBCOMM_PSY_ALL_CORE
COMMUNICATION, IMPAIRED VERBAL

## 2022-01-05 NOTE — BH INPATIENT PSYCHIATRY PROGRESS NOTE - NSTXSUICIDDATEEST_PSY_ALL_CORE
17-Dec-2021
17-Dec-2021
10-Dec-2021
17-Dec-2021
31-Dec-2021
10-Dec-2021
17-Dec-2021
17-Dec-2021
10-Dec-2021
17-Dec-2021
10-Dec-2021
04-Jan-2022

## 2022-01-05 NOTE — BH INPATIENT PSYCHIATRY PROGRESS NOTE - NSICDXBHPRIMARYDX_PSY_ALL_CORE
Psychosis   F29  

## 2022-01-05 NOTE — BH INPATIENT PSYCHIATRY PROGRESS NOTE - NSTXPSYCHODATETRGT_PSY_ALL_CORE
27-Dec-2021
17-Dec-2021
11-Jan-2022
05-Jan-2022
27-Dec-2021
17-Dec-2021
27-Dec-2021
27-Dec-2021
05-Jan-2022
05-Jan-2022
17-Dec-2021
17-Dec-2021
05-Jan-2022
11-Jan-2022

## 2022-01-07 NOTE — SOCIAL WORK POST DISCHARGE FOLLOW UP NOTE - NSBHSWFOLLOWUP_PSY_ALL_CORE_FT
SW was informed by nursing that Pt and Pt's brother appeared to be confused about Pt's follow up appt. Pt had accepted the appt prior to discharge. SW attempted contact wit Pt's  brother Gabo Russo [phone].  SW left msg of SW's intentions to communicate re: Pt. Pt was encouraged to comply with continuity of care needs throughout hospital stay and at time of discharge. At time of Pt's discharge treatment team deemed Pt was not at risk to self nor others.  SW to follow up.

## 2022-01-07 NOTE — SOCIAL WORK POST DISCHARGE FOLLOW UP NOTE - NSBHSWFOLLOWUP_PSY_ALL_CORE_FT
SW contacted Pt's brother and explained SW will have  call. SW contacted Pt's brother with the assistance of  Christie #[id number].  Pt's brother reported that he would not be able to transport Pt to outpt appt on Monday because of work.  SW explained the importance of the follow up appt. Pt's brother reported that he would have someone else transport Pt to out pt follow up. Pt 's brother requested location info and time. All was provided. SW explained that should he misplace the info, that the info is also underlined on Pt's discharge document. Pt's brother was receptive and accepted the info. Pt's brother reported gratitude for the info. Pt was encouraged to comply with continuity of care needs throughout hospital stay and at time of discharge. At time of Pt's discharge treatment team deemed Pt was not at risk to self nor others.  No further SW interventions needed at this time.

## 2022-10-14 NOTE — ED BEHAVIORAL HEALTH ASSESSMENT NOTE - NS ED B[facility] PLAN HOLD IN ED HANDOFF TO ED TEAM YN
Call from [facility], see triage note  Best number to contact him today [phone]  His [employer] holiday begins tonight, he will be unable to answer/speak on phone until next Wednesday.  Needs call back today  Nurse Triage SBAR    Is this a 2nd Level Triage? YES, LICENSED PRACTITIONER REVIEW IS REQUIRED    Situation:   over the past several days experiencing painful burning with urination and urgency, urinating every hour.  Denies blood or clots in urine, fever.  Tries to drink enough water.  Denies lower abdominal pain or flank pain.  Has chronic back pain.      Background:   recently started on mirabegron, he feels this is adding to his symptoms.  Stopped taking med 5-6 days ago.  Has been off of finasteride and tamsulosin for at least a month, as pharmacy unable to reach clinic for refill    Assessment:   follow up per urology team today  Refills sent to pharmacy for both finasteride and tamsulosin today    Protocol Recommended Disposition:   See in Office Today        Reason for Disposition    Urinating more frequently than usual (i.e., frequency)    Additional Information    Negative: Shock suspected (e.g., cold/pale/clammy skin, too weak to stand, low BP, rapid pulse)    Negative: Sounds like a life-threatening emergency to the triager    Negative: Followed a female genital area injury (e.g., vagina, vulva)    Negative: Followed a male genital area injury (penis, scrotum)    Negative: Vaginal discharge    Negative: Pus (white, yellow) or bloody discharge from end of penis    Negative: Pain or burning with passing urine (urination) and pregnant    Negative: Pain or burning with passing urine (urination) and female    Negative: Pain or burning with passing urine (urination) and male    Negative: Pain or itching in the vulvar area    Negative: Pain in scrotum is main symptom    Negative: Blood in the urine is main symptom    Negative: Symptoms arising from use of a urinary catheter (e.g., coude, Chamberlain)     "Negative: Unable to urinate (or only a few drops) > 4 hours and bladder feels very full (e.g., palpable bladder or strong urge to urinate)    Negative: Decreased urination and drinking very little and dehydration suspected (e.g., dark urine, no urine > 12 hours, very dry mouth, very lightheaded)    Negative: Patient sounds very sick or weak to the triager    Negative: Fever > 100.4 F  (38.0 C)    Answer Assessment - Initial Assessment Questions  1. SYMPTOM: \"What's the main symptom you're concerned about?\" (e.g., frequency, incontinence)      Painful burning with urination urgency   2. ONSET: \"When did the  symptoms  start?\"      Last several days  3. PAIN: \"Is there any pain?\" If Yes, ask: \"How bad is it?\" (Scale: 1-10; mild, moderate, severe)      Moderate/severe  4. CAUSE: \"What do you think is causing the symptoms?\"      Thinks is because hasn't had finasteride or tamsulosin for the the last month or so and thinks mirabegron making it worse.  Has stopped for the last 5-6 days  5. OTHER SYMPTOMS: \"Do you have any other symptoms?\" (e.g., fever, flank pain, blood in urine, pain with urination)      Denies fever, blood or clots in urine.  Describes urine color as yellow, tries to drink enough water  6. PREGNANCY: \"Is there any chance you are pregnant?\" \"When was your last menstrual period?\"      N/A    Protocols used: URINARY SYMPTOMS-A-OH      " Yes

## 2022-12-01 NOTE — BH INPATIENT PSYCHIATRY DISCHARGE NOTE - NSDCMRMEDTOKEN_GEN_ALL_CORE_FT
PDMP reviewed; no aberrant behavior identified, prescription authorized.     amLODIPine 5 mg oral tablet:   losartan 50 mg oral tablet: 1 tab(s) orally  metoprolol tartrate 25 mg oral tablet:    amLODIPine 5 mg oral tablet: 1 tab(s) orally once a day in the morning  losartan 50 mg oral tablet: 1 tab(s) orally once a day   metoprolol tartrate 25 mg oral tablet: 1 tab(s) orally 2 times a day  naltrexone 50 mg oral tablet: 1 tab(s) orally once a day in the morning  risperiDONE 2 mg oral tablet: 1 tab(s) orally once a day in the morning  risperiDONE 4 mg oral tablet: 1 tab(s) orally once a day (at bedtime)

## 2023-01-16 NOTE — BH TREATMENT PLAN - NSTXCAREGIVERPARTICIPATE_PSY_P_CORE
Patient was seen in urgent care 1/14/23. Nothing further needed.  
Family/Caregiver participated in identification of needs/problems/goals for treatment

## 2024-05-21 NOTE — BH PATIENT PROFILE - FLU SEASON?
Ranjeet Castro is calling to request a refill on the following medication(s):    Medication Request:  Requested Prescriptions     Pending Prescriptions Disp Refills    fluticasone (FLONASE) 50 MCG/ACT nasal spray 32 g 5     Si spray by Each Nostril route daily    diclofenac sodium (VOLTAREN) 1 % GEL 50 g 5     Sig: Apply 4 g topically 4 times daily       Last Visit Date (If Applicable):  2024    Next Visit Date:    Visit date not found     Yes...
